# Patient Record
Sex: FEMALE | Race: WHITE | Employment: FULL TIME | URBAN - METROPOLITAN AREA
[De-identification: names, ages, dates, MRNs, and addresses within clinical notes are randomized per-mention and may not be internally consistent; named-entity substitution may affect disease eponyms.]

---

## 2018-11-09 ENCOUNTER — AMBULATORY - HEALTHEAST (OUTPATIENT)
Dept: ADMINISTRATIVE | Facility: CLINIC | Age: 53
End: 2018-11-09

## 2018-11-12 ENCOUNTER — OFFICE VISIT - HEALTHEAST (OUTPATIENT)
Dept: GERIATRICS | Facility: CLINIC | Age: 53
End: 2018-11-12

## 2018-11-12 DIAGNOSIS — T56.891D LITHIUM TOXICITY, ACCIDENTAL OR UNINTENTIONAL, SUBSEQUENT ENCOUNTER: ICD-10-CM

## 2018-11-12 DIAGNOSIS — I89.0 LYMPHEDEMA: ICD-10-CM

## 2018-11-12 DIAGNOSIS — S22.42XD CLOSED FRACTURE OF MULTIPLE RIBS OF LEFT SIDE WITH ROUTINE HEALING, SUBSEQUENT ENCOUNTER: ICD-10-CM

## 2018-11-12 DIAGNOSIS — I10 ESSENTIAL HYPERTENSION: ICD-10-CM

## 2018-11-12 DIAGNOSIS — F32.1 CURRENT MODERATE EPISODE OF MAJOR DEPRESSIVE DISORDER, UNSPECIFIED WHETHER RECURRENT (H): ICD-10-CM

## 2018-11-14 ENCOUNTER — OFFICE VISIT - HEALTHEAST (OUTPATIENT)
Dept: GERIATRICS | Facility: CLINIC | Age: 53
End: 2018-11-14

## 2018-11-14 DIAGNOSIS — I10 ESSENTIAL HYPERTENSION: ICD-10-CM

## 2018-11-14 DIAGNOSIS — S22.42XD CLOSED FRACTURE OF MULTIPLE RIBS OF LEFT SIDE WITH ROUTINE HEALING, SUBSEQUENT ENCOUNTER: ICD-10-CM

## 2018-11-14 DIAGNOSIS — I89.0 LYMPHEDEMA: ICD-10-CM

## 2018-11-14 DIAGNOSIS — F32.1 CURRENT MODERATE EPISODE OF MAJOR DEPRESSIVE DISORDER, UNSPECIFIED WHETHER RECURRENT (H): ICD-10-CM

## 2018-11-14 DIAGNOSIS — T56.891D LITHIUM TOXICITY, ACCIDENTAL OR UNINTENTIONAL, SUBSEQUENT ENCOUNTER: ICD-10-CM

## 2018-11-15 ENCOUNTER — OFFICE VISIT - HEALTHEAST (OUTPATIENT)
Dept: GERIATRICS | Facility: CLINIC | Age: 53
End: 2018-11-15

## 2018-11-15 DIAGNOSIS — E53.8 VITAMIN B 12 DEFICIENCY: ICD-10-CM

## 2018-11-15 DIAGNOSIS — F41.9 ANXIETY: ICD-10-CM

## 2018-11-15 DIAGNOSIS — S22.49XD CLOSED FRACTURE OF MULTIPLE RIBS WITH ROUTINE HEALING, UNSPECIFIED LATERALITY, SUBSEQUENT ENCOUNTER: ICD-10-CM

## 2018-11-15 DIAGNOSIS — R52 PAIN: ICD-10-CM

## 2018-11-15 DIAGNOSIS — T56.891D LITHIUM TOXICITY, ACCIDENTAL OR UNINTENTIONAL, SUBSEQUENT ENCOUNTER: ICD-10-CM

## 2018-11-15 DIAGNOSIS — F51.02 ADJUSTMENT INSOMNIA: ICD-10-CM

## 2018-11-15 DIAGNOSIS — K21.9 GASTROESOPHAGEAL REFLUX DISEASE WITHOUT ESOPHAGITIS: ICD-10-CM

## 2018-11-15 DIAGNOSIS — K59.01 SLOW TRANSIT CONSTIPATION: ICD-10-CM

## 2018-11-16 ENCOUNTER — RECORDS - HEALTHEAST (OUTPATIENT)
Dept: LAB | Facility: CLINIC | Age: 53
End: 2018-11-16

## 2018-11-16 ENCOUNTER — COMMUNICATION - HEALTHEAST (OUTPATIENT)
Dept: GERIATRICS | Facility: CLINIC | Age: 53
End: 2018-11-16

## 2018-11-16 DIAGNOSIS — R52 PAIN: ICD-10-CM

## 2018-11-16 LAB
ANION GAP SERPL CALCULATED.3IONS-SCNC: 7 MMOL/L (ref 5–18)
BUN SERPL-MCNC: 7 MG/DL (ref 8–22)
CALCIUM SERPL-MCNC: 9.3 MG/DL (ref 8.5–10.5)
CHLORIDE BLD-SCNC: 102 MMOL/L (ref 98–107)
CO2 SERPL-SCNC: 32 MMOL/L (ref 22–31)
CREAT SERPL-MCNC: 0.77 MG/DL (ref 0.6–1.1)
GFR SERPL CREATININE-BSD FRML MDRD: >60 ML/MIN/1.73M2
GLUCOSE BLD-MCNC: 75 MG/DL (ref 70–125)
POTASSIUM BLD-SCNC: 3.7 MMOL/L (ref 3.5–5)
SODIUM SERPL-SCNC: 141 MMOL/L (ref 136–145)

## 2018-11-19 ENCOUNTER — AMBULATORY - HEALTHEAST (OUTPATIENT)
Dept: GERIATRICS | Facility: CLINIC | Age: 53
End: 2018-11-19

## 2019-07-23 ENCOUNTER — HOSPITAL ENCOUNTER (INPATIENT)
Facility: CLINIC | Age: 54
LOS: 3 days | Discharge: HOME | DRG: 885 | End: 2019-07-26
Attending: PSYCHIATRY & NEUROLOGY | Admitting: PSYCHIATRY & NEUROLOGY
Payer: COMMERCIAL

## 2019-07-23 DIAGNOSIS — F31.81 BIPOLAR 2 DISORDER (H): ICD-10-CM

## 2019-07-23 DIAGNOSIS — F33.2 SEVERE RECURRENT MAJOR DEPRESSION WITHOUT PSYCHOTIC FEATURES (H): Primary | ICD-10-CM

## 2019-07-23 PROBLEM — F32.A DEPRESSION: Status: ACTIVE | Noted: 2019-07-23

## 2019-07-23 LAB
ANION GAP SERPL CALCULATED.3IONS-SCNC: 4 MMOL/L (ref 3–14)
BUN SERPL-MCNC: 6 MG/DL (ref 7–30)
CALCIUM SERPL-MCNC: 8.6 MG/DL (ref 8.5–10.1)
CHLORIDE SERPL-SCNC: 107 MMOL/L (ref 94–109)
CO2 SERPL-SCNC: 29 MMOL/L (ref 20–32)
CREAT SERPL-MCNC: 0.58 MG/DL (ref 0.52–1.04)
ERYTHROCYTE [DISTWIDTH] IN BLOOD BY AUTOMATED COUNT: 13.5 % (ref 10–15)
GFR SERPL CREATININE-BSD FRML MDRD: >90 ML/MIN/{1.73_M2}
GLUCOSE SERPL-MCNC: 96 MG/DL (ref 70–99)
HCG SERPL QL: NEGATIVE
HCT VFR BLD AUTO: 38.4 % (ref 35–47)
HGB BLD-MCNC: 12.9 G/DL (ref 11.7–15.7)
INR PPP: 1.89 (ref 0.86–1.14)
MCH RBC QN AUTO: 31.4 PG (ref 26.5–33)
MCHC RBC AUTO-ENTMCNC: 33.6 G/DL (ref 31.5–36.5)
MCV RBC AUTO: 93 FL (ref 78–100)
PLATELET # BLD AUTO: 375 10E9/L (ref 150–450)
POTASSIUM SERPL-SCNC: 3.9 MMOL/L (ref 3.4–5.3)
RBC # BLD AUTO: 4.11 10E12/L (ref 3.8–5.2)
SODIUM SERPL-SCNC: 140 MMOL/L (ref 133–144)
TSH SERPL DL<=0.005 MIU/L-ACNC: 3.6 MU/L (ref 0.4–4)
WBC # BLD AUTO: 9.5 10E9/L (ref 4–11)

## 2019-07-23 PROCEDURE — 25000132 ZZH RX MED GY IP 250 OP 250 PS 637: Performed by: PSYCHIATRY & NEUROLOGY

## 2019-07-23 PROCEDURE — 85610 PROTHROMBIN TIME: CPT | Performed by: PSYCHIATRY & NEUROLOGY

## 2019-07-23 PROCEDURE — 93010 ELECTROCARDIOGRAM REPORT: CPT | Performed by: INTERNAL MEDICINE

## 2019-07-23 PROCEDURE — 84703 CHORIONIC GONADOTROPIN ASSAY: CPT | Performed by: PSYCHIATRY & NEUROLOGY

## 2019-07-23 PROCEDURE — 84443 ASSAY THYROID STIM HORMONE: CPT | Performed by: PSYCHIATRY & NEUROLOGY

## 2019-07-23 PROCEDURE — 36415 COLL VENOUS BLD VENIPUNCTURE: CPT | Performed by: PSYCHIATRY & NEUROLOGY

## 2019-07-23 PROCEDURE — 93005 ELECTROCARDIOGRAM TRACING: CPT

## 2019-07-23 PROCEDURE — 25000132 ZZH RX MED GY IP 250 OP 250 PS 637

## 2019-07-23 PROCEDURE — 85027 COMPLETE CBC AUTOMATED: CPT | Performed by: PSYCHIATRY & NEUROLOGY

## 2019-07-23 PROCEDURE — 12400000 ZZH R&B MH

## 2019-07-23 PROCEDURE — 80048 BASIC METABOLIC PNL TOTAL CA: CPT | Performed by: PSYCHIATRY & NEUROLOGY

## 2019-07-23 RX ORDER — ERGOCALCIFEROL 1.25 MG/1
50000 CAPSULE, LIQUID FILLED ORAL WEEKLY
Status: DISCONTINUED | OUTPATIENT
Start: 2019-07-24 | End: 2019-07-26 | Stop reason: HOSPADM

## 2019-07-23 RX ORDER — DULOXETIN HYDROCHLORIDE 60 MG/1
60 CAPSULE, DELAYED RELEASE ORAL DAILY
Status: DISCONTINUED | OUTPATIENT
Start: 2019-07-24 | End: 2019-07-26 | Stop reason: HOSPADM

## 2019-07-23 RX ORDER — LAMOTRIGINE 100 MG/1
100 TABLET ORAL DAILY
Status: DISCONTINUED | OUTPATIENT
Start: 2019-07-23 | End: 2019-07-26 | Stop reason: HOSPADM

## 2019-07-23 RX ORDER — CLONAZEPAM 0.5 MG/1
0.5 TABLET ORAL AT BEDTIME
Status: DISCONTINUED | OUTPATIENT
Start: 2019-07-23 | End: 2019-07-26 | Stop reason: HOSPADM

## 2019-07-23 RX ORDER — LITHIUM CARBONATE 300 MG/1
300 TABLET, FILM COATED, EXTENDED RELEASE ORAL 2 TIMES DAILY
COMMUNITY

## 2019-07-23 RX ORDER — CLONAZEPAM 0.5 MG/1
0.5 TABLET ORAL AT BEDTIME
COMMUNITY

## 2019-07-23 RX ORDER — QUETIAPINE FUMARATE 300 MG/1
300 TABLET, FILM COATED ORAL AT BEDTIME
COMMUNITY

## 2019-07-23 RX ORDER — ALBUTEROL SULFATE 90 UG/1
1-2 AEROSOL, METERED RESPIRATORY (INHALATION) EVERY 4 HOURS PRN
Status: DISCONTINUED | OUTPATIENT
Start: 2019-07-23 | End: 2019-07-26 | Stop reason: HOSPADM

## 2019-07-23 RX ORDER — HYDROXYZINE HYDROCHLORIDE 25 MG/1
25 TABLET, FILM COATED ORAL EVERY 4 HOURS PRN
Status: DISCONTINUED | OUTPATIENT
Start: 2019-07-23 | End: 2019-07-26 | Stop reason: HOSPADM

## 2019-07-23 RX ORDER — CYANOCOBALAMIN 1000 UG/ML
1000 INJECTION, SOLUTION INTRAMUSCULAR; SUBCUTANEOUS
Status: DISCONTINUED | OUTPATIENT
Start: 2019-08-01 | End: 2019-07-26 | Stop reason: HOSPADM

## 2019-07-23 RX ORDER — PANTOPRAZOLE SODIUM 40 MG/1
40 TABLET, DELAYED RELEASE ORAL DAILY
COMMUNITY

## 2019-07-23 RX ORDER — DIPHENHYDRAMINE HCL 25 MG
25-50 CAPSULE ORAL EVERY 6 HOURS PRN
Status: DISCONTINUED | OUTPATIENT
Start: 2019-07-23 | End: 2019-07-26 | Stop reason: HOSPADM

## 2019-07-23 RX ORDER — PANTOPRAZOLE SODIUM 40 MG/1
40 TABLET, DELAYED RELEASE ORAL DAILY
Status: DISCONTINUED | OUTPATIENT
Start: 2019-07-24 | End: 2019-07-26 | Stop reason: HOSPADM

## 2019-07-23 RX ORDER — WARFARIN SODIUM 5 MG/1
5 TABLET ORAL DAILY
Status: DISCONTINUED | OUTPATIENT
Start: 2019-07-23 | End: 2019-07-23

## 2019-07-23 RX ORDER — WARFARIN SODIUM 10 MG/1
10 TABLET ORAL
Status: COMPLETED | OUTPATIENT
Start: 2019-07-23 | End: 2019-07-23

## 2019-07-23 RX ORDER — LITHIUM CARBONATE 300 MG/1
300 TABLET, FILM COATED, EXTENDED RELEASE ORAL 2 TIMES DAILY
Status: DISCONTINUED | OUTPATIENT
Start: 2019-07-23 | End: 2019-07-24

## 2019-07-23 RX ORDER — GABAPENTIN 300 MG/1
300 CAPSULE ORAL 3 TIMES DAILY
Status: DISCONTINUED | OUTPATIENT
Start: 2019-07-23 | End: 2019-07-26 | Stop reason: HOSPADM

## 2019-07-23 RX ORDER — LAMOTRIGINE 100 MG/1
100 TABLET ORAL DAILY
COMMUNITY

## 2019-07-23 RX ORDER — LAMOTRIGINE 100 MG/1
200 TABLET ORAL DAILY
Status: DISCONTINUED | OUTPATIENT
Start: 2019-07-24 | End: 2019-07-26 | Stop reason: HOSPADM

## 2019-07-23 RX ORDER — ERGOCALCIFEROL 1.25 MG/1
50000 CAPSULE ORAL WEEKLY
COMMUNITY

## 2019-07-23 RX ORDER — LORATADINE 10 MG/1
10 TABLET ORAL DAILY
Status: DISCONTINUED | OUTPATIENT
Start: 2019-07-24 | End: 2019-07-26 | Stop reason: HOSPADM

## 2019-07-23 RX ADMIN — QUETIAPINE FUMARATE 300 MG: 200 TABLET ORAL at 22:55

## 2019-07-23 RX ADMIN — GABAPENTIN 300 MG: 300 CAPSULE ORAL at 16:59

## 2019-07-23 RX ADMIN — WARFARIN SODIUM 10 MG: 10 TABLET ORAL at 18:24

## 2019-07-23 RX ADMIN — LAMOTRIGINE 100 MG: 100 TABLET ORAL at 16:59

## 2019-07-23 RX ADMIN — HYDROXYZINE HYDROCHLORIDE 25 MG: 25 TABLET ORAL at 20:16

## 2019-07-23 RX ADMIN — GABAPENTIN 300 MG: 300 CAPSULE ORAL at 22:55

## 2019-07-23 ASSESSMENT — ACTIVITIES OF DAILY LIVING (ADL)
TRANSFERRING: 0-->INDEPENDENT
HYGIENE/GROOMING: INDEPENDENT
NUMBER_OF_TIMES_PATIENT_HAS_FALLEN_WITHIN_LAST_SIX_MONTHS: 3
DRESS: 0-->INDEPENDENT
BATHING: 0-->INDEPENDENT
SWALLOWING: 0-->SWALLOWS FOODS/LIQUIDS WITHOUT DIFFICULTY
TOILETING: 0-->INDEPENDENT
AMBULATION: 0-->INDEPENDENT
ORAL_HYGIENE: INDEPENDENT
DRESS: SCRUBS (BEHAVIORAL HEALTH);INDEPENDENT
COGNITION: 0 - NO COGNITION ISSUES REPORTED
RETIRED_EATING: 0-->INDEPENDENT
LAUNDRY: WITH SUPERVISION
RETIRED_COMMUNICATION: 0-->UNDERSTANDS/COMMUNICATES WITHOUT DIFFICULTY
FALL_HISTORY_WITHIN_LAST_SIX_MONTHS: YES

## 2019-07-23 ASSESSMENT — MIFFLIN-ST. JEOR: SCORE: 1598.32

## 2019-07-23 NOTE — PHARMACY-ANTICOAGULATION SERVICE
Clinical Pharmacy - Warfarin Dosing Consult     Pharmacy has been consulted to manage this patient s warfarin therapy.  Indication: DVT/ PE Treatment  Therapy Goal: INR 2-3  Warfarin Prior to Admission: Yes  Warfarin PTA Regimen: 7.5 mg on Monday and Friday, 10 mg on all other days   Dose Comments: Sightly subtherapeutic     INR   Date Value Ref Range Status   07/23/2019 1.89 (H) 0.86 - 1.14 Final   10/01/2014 2.95 (H) 0.86 - 1.14 Final       Recommend warfarin 10 mg today.  Pharmacy will monitor Nikky Hancock daily and order warfarin doses to achieve specified goal.      Please contact pharmacy as soon as possible if the warfarin needs to be held for a procedure or if the warfarin goals change.      Ritchie HirschD

## 2019-07-23 NOTE — PLAN OF CARE
Pt mood is depressed, emotional when talking to staff but able to collect herself. Thought process is intact. Behavior is appropriate.  Insight is good.  Pt denies suicidal ideation.  Pt is agreeable with staff and watched the ECT video with her , all questions answered.  EKG, and labs collected, still need Hospitalist to clear pt.  Up and ate supper with her peers this evening.

## 2019-07-23 NOTE — PROGRESS NOTES
07/23/19 1455   Patient Belongings   Did you bring any home meds/supplements to the hospital?  No   Patient Belongings locker   Patient Belongings Put in Hospital Secure Location (Security or Locker, etc.) clothing;other (see comments)   Belongings Search Yes   Clothing Search Yes   Second Staff Annamarie   Comment All belongings searched and placed in locker     T-Shirt x4  Pants  Shoes w/laces  Socks x9  CPAP w/Bag  Gym Bag  Books x3  Glasses w/Case  Underwear x8  Shorts w/Strings  Sweat pants w/Strings x3  Bra x6  Hair Brush  Toothpaste  Toothbrush  Feminine Pads  Lotion  Baby Powder  Face Cleanser  Soap    Admission:  I am responsible for any personal items that are not sent to the safe or pharmacy. [facility] is not responsible for loss, theft or damage of any property in my possession.        Patient Signature: ___________________________________________      Date/Time:__________________________     Staff Signature: __________________________________      Date/Time:__________________________     2nd Staff person, if patient is unable/unwilling to sign:      __________________________________________________________      Date/Time: __________________________        Discharge:  [facility] has returned all of my personal belongings:     Patient Signature: ________________________________________     Date/Time: ____________________________________     Staff Signature: ______________________________________     Date/Time:_____________________________________

## 2019-07-24 ENCOUNTER — ANESTHESIA EVENT (OUTPATIENT)
Dept: BEHAVIORAL HEALTH | Facility: CLINIC | Age: 54
End: 2019-07-24

## 2019-07-24 ENCOUNTER — ANESTHESIA (OUTPATIENT)
Dept: BEHAVIORAL HEALTH | Facility: CLINIC | Age: 54
End: 2019-07-24

## 2019-07-24 LAB — INR PPP: 1.76 (ref 0.86–1.14)

## 2019-07-24 PROCEDURE — 25000132 ZZH RX MED GY IP 250 OP 250 PS 637: Performed by: PSYCHIATRY & NEUROLOGY

## 2019-07-24 PROCEDURE — 99223 1ST HOSP IP/OBS HIGH 75: CPT | Mod: AI | Performed by: INTERNAL MEDICINE

## 2019-07-24 PROCEDURE — 90791 PSYCH DIAGNOSTIC EVALUATION: CPT

## 2019-07-24 PROCEDURE — 90853 GROUP PSYCHOTHERAPY: CPT

## 2019-07-24 PROCEDURE — 12400000 ZZH R&B MH

## 2019-07-24 PROCEDURE — 36415 COLL VENOUS BLD VENIPUNCTURE: CPT | Performed by: PHYSICIAN ASSISTANT

## 2019-07-24 PROCEDURE — 85610 PROTHROMBIN TIME: CPT | Performed by: PHYSICIAN ASSISTANT

## 2019-07-24 RX ORDER — ACETAMINOPHEN 325 MG/1
650 TABLET ORAL EVERY 4 HOURS PRN
Status: DISCONTINUED | OUTPATIENT
Start: 2019-07-24 | End: 2019-07-26 | Stop reason: HOSPADM

## 2019-07-24 RX ORDER — WARFARIN SODIUM 10 MG/1
10 TABLET ORAL
Status: COMPLETED | OUTPATIENT
Start: 2019-07-24 | End: 2019-07-24

## 2019-07-24 RX ADMIN — GABAPENTIN 300 MG: 300 CAPSULE ORAL at 07:59

## 2019-07-24 RX ADMIN — LAMOTRIGINE 100 MG: 100 TABLET ORAL at 13:51

## 2019-07-24 RX ADMIN — HYDROXYZINE HYDROCHLORIDE 25 MG: 25 TABLET ORAL at 10:35

## 2019-07-24 RX ADMIN — LORATADINE 10 MG: 10 TABLET ORAL at 07:59

## 2019-07-24 RX ADMIN — ACETAMINOPHEN 650 MG: 325 TABLET, FILM COATED ORAL at 18:47

## 2019-07-24 RX ADMIN — WARFARIN SODIUM 10 MG: 10 TABLET ORAL at 18:45

## 2019-07-24 RX ADMIN — QUETIAPINE FUMARATE 300 MG: 200 TABLET ORAL at 22:10

## 2019-07-24 RX ADMIN — FLUTICASONE FUROATE AND VILANTEROL TRIFENATATE 1 PUFF: 200; 25 POWDER RESPIRATORY (INHALATION) at 08:00

## 2019-07-24 RX ADMIN — ERGOCALCIFEROL 50000 UNITS: 1.25 CAPSULE, LIQUID FILLED ORAL at 10:34

## 2019-07-24 RX ADMIN — ACETAMINOPHEN 650 MG: 325 TABLET, FILM COATED ORAL at 09:23

## 2019-07-24 RX ADMIN — DULOXETINE HYDROCHLORIDE 60 MG: 60 CAPSULE, DELAYED RELEASE ORAL at 07:59

## 2019-07-24 RX ADMIN — LAMOTRIGINE 200 MG: 100 TABLET ORAL at 08:00

## 2019-07-24 RX ADMIN — GABAPENTIN 300 MG: 300 CAPSULE ORAL at 22:10

## 2019-07-24 RX ADMIN — LITHIUM CARBONATE 300 MG: 300 TABLET, EXTENDED RELEASE ORAL at 07:59

## 2019-07-24 RX ADMIN — CLONAZEPAM 0.5 MG: 0.5 TABLET ORAL at 22:10

## 2019-07-24 RX ADMIN — PANTOPRAZOLE SODIUM 40 MG: 40 TABLET, DELAYED RELEASE ORAL at 07:59

## 2019-07-24 RX ADMIN — GABAPENTIN 300 MG: 300 CAPSULE ORAL at 16:18

## 2019-07-24 ASSESSMENT — ACTIVITIES OF DAILY LIVING (ADL)
ORAL_HYGIENE: INDEPENDENT
DRESS: SCRUBS (BEHAVIORAL HEALTH)
HYGIENE/GROOMING: INDEPENDENT

## 2019-07-24 NOTE — H&P
[facility]    History and Physical - Hospitalist Service       Date of Admission:  7/23/2019    Assessment & Plan   Nikky Hancock is a 54 year old female admitted on 7/23/2019 to inpatient psychiatry for worsening anxiety. Hospitalist consulted for ECT clearance    Bipolar 2 disorder  Generalized Anxiety Disorder: Management per Psychiatry. Patient plans to undergo ECT. She is able to complete 7 mets of activity and has no history of CAD. Her EKG is without ischemic changes. She does take Warfarin for protein S deficiency. Discussed with patient slightly increased risk of intracranial bleed in the setting of anticoagulation but if INR kept below 3 risk is minimal.  - Medically optimized to proceed with ECT. Discussed with pharmacy to keep INR closer to 2-2.5 with ECT. BP stable. Could consider BB if develops significant HTN with ECT  - Further management per Psychiatry    H/o recurrent DVT/PE  Protein S deficiency: Anticoagulated with Warfarin. INR 1.8  - Continue warfarin with Pharmacy to dose. Discussed with pharmacy, will keep goal close to 2-2.5    GERD  - Continue PTA PPI    TANYA  - Continue CPAP       Diet: Regular Diet Adult    DVT Prophylaxis: Warfarin  Poon Catheter: not present  Code Status: Full Code      Disposition Plan   Expected discharge: Per Psych. Hospitalist will sign off. Please call if questions  Entered: Lis Martines PA-C 07/24/2019, 8:24 AM     The patient's care was discussed with the Bedside Nurse and Patient.    Lis Martines PA-C  [facility]    Patient seen and examined.  Agree with impression and plan. Will continue warfarin 10 mg daily, and check INR daily (not sure why INR borderline low -- she did not miss any doses).  Has mild headache -- will use Tylenol PRN.  Can always use a NSAID if needed (I.e. Ibuprofen) tylenol usually works, and is safer given she is on Warfarin.     Nuno Vergara  Pager: [phone]  Cell Phone:   [phone]      ______________________________________________________________________    Chief Complaint   Worsening Anxiety        History of Present Illness   Nikky Hancock is a 54 year old female with a past medical history of bipolar affective disorder and anxiety as well as protein S deficiency and chronic anticoagulation who was admitted to inpatient psychiatry with plans for inpatient ECT therapy.    Patient is evaluated in the stepdown unit of psychiatry.  She offers no complaints at this time.  She is been anticoagulated with warfarin for approximately the past 7 years.  No recent history of DVT.  Her goal INR is 2-3.  She is never undergone ECT in the past.  Has undergone previous anesthesia without complications.  She has no history of coronary artery disease.  Is able to complete 7 METs of activity without exertional symptoms.  No recent exertional shortness of breath or chest pain.  No recent URI symptoms.    Review of Systems    The 10 point Review of Systems is negative other than noted in the HPI or here.     Past Medical History    I have reviewed this patient's medical history and updated it with pertinent information if needed.   Past Medical History:   Diagnosis Date     Alcohol abuse, in remission      Alcohol addiction (H)      Anxiety      Asthma      Bipolar 2 disorder (H)      Depressive disorder      GERD (gastroesophageal reflux disease)      TANYA (obstructive sleep apnea)      Protein S deficiency (H)      Pulmonary embolism (H)        Past Surgical History   I have reviewed this patient's surgical history and updated it with pertinent information if needed.  Past Surgical History:   Procedure Laterality Date     BYPASS GASTRIC DUODENAL SWITCH       C NONSPECIFIC PROCEDURE      c/s, gastric bypass , nicolas     CHOLECYSTECTOMY       GYN SURGERY         Social History   I have reviewed this patient's social history and updated it with pertinent information if needed.  Social History      Tobacco Use     Smoking status: Never Smoker   Substance Use Topics     Alcohol use: No     Drug use: Not on file       Family History   I have reviewed this patient's family history and updated it with pertinent information if needed.   Family History   Problem Relation Age of Onset     Lipids Mother      Hypertension Father      Lipids Father      Lipids Brother      Lipids Brother        Prior to Admission Medications   Prior to Admission Medications   Prescriptions Last Dose Informant Patient Reported? Taking?   ADVAIR DISKUS 500-50 MCG/DOSE IN AEPB 2019 at Unknown time  Yes Yes   Si PUFF BY INHALATION EVERY day   CAMPRAL 333 MG OR TBEC Unknown at Unknown time  Yes No   Si TABLETS ORALLY 3 TIMES DAILY   CLARITIN 10 MG PO TABS 2019 at Unknown time  Yes Yes   Si TABLET ORALLY DAILY   DULoxetine (CYMBALTA) 60 MG capsule 2019 at Unknown time  No Yes   Sig: Take 1 capsule by mouth daily.   EPINEPHrine (EPIPEN) 0.3 MG/0.3ML injection Unknown at Unknown time  No No   Sig: Inject 0.3 mLs into the muscle once as needed for anaphylaxis for 1 dose.   FOLIC ACID 400 MCG PO TABS Unknown at Unknown time  Yes No   Si TABLET ORALLY DAILY   HYDROcodone-acetaminophen (NORCO) 5-325 MG per tablet Unknown at Unknown time  No No   Sig: Take 1-2 tablets by mouth every 4 hours as needed for pain   Lansoprazole (PREVACID PO) Unknown at Unknown time  Yes No   Sig: Take 30 mg by mouth every morning (before breakfast).   MULTI-VITAMIN OR TABS Unknown at Unknown time  Yes No   Si TABLET ORALLY DAILY   NALTREXONE HCL PO Unknown at Unknown time  Yes No   Sig: Take 50 mg by mouth daily.   QUEtiapine (SEROQUEL XR) 300 MG 24 hr tablet Unknown at Unknown time  No No   Sig: Take 1 tablet by mouth daily.   QUEtiapine (SEROQUEL) 300 MG tablet 2019 at Unknown time  Yes Yes   Sig: Take 300 mg by mouth At Bedtime   REQUIP 1 MG PO TABS Unknown at Unknown time  Yes No   Si TABLET ORALLY AT BEDTIME, may take  an additional 1 mg in evening as needed   TRAZODONE  MG OR TABS Unknown at Unknown time  Yes No   Si MG = 2 TABLETS ORALLY AT BEDTIME   VENTOLIN  (90 BASE) MCG/ACT IN AERS Unknown at Unknown time  Yes No   Si PUFFS BY INHALATION EVERY 4 HOURS AS NEEDED   VITAMIN B-12 1000 MCG/ML IJ SOLN 2019 at Unknown time  Yes Yes   Si MCG ONCE MONTHLY BY INTRAMUSCULAR INJECTION   benzonatate (TESSALON) 200 MG capsule Unknown at Unknown time  No No   Sig: Take 1 capsule (200 mg) by mouth 3 times daily as needed for cough   clonazePAM (KLONOPIN) 0.5 MG tablet 2019 at Unknown time  Yes Yes   Sig: Take 0.5 mg by mouth At Bedtime   diphenhydrAMINE (BENADRYL) 25 MG tablet Past Week at Unknown time  No Yes   Sig: Take 2 tablets by mouth every 6 hours as needed (swelling, itching).   disulfiram (ANTABUSE) 250 MG tablet Unknown at Unknown time  Yes No   Sig: Take 500 mg by mouth daily.   divalproex (DEPAKOTE) 500 MG 24 hr tablet Unknown at Unknown time  No No   Sig: Take 2 tablets by mouth every evening.   ergocalciferol (ERGOCALCIFEROL) 18617 units capsule Past Week at Unknown time  Yes Yes   Sig: Take 50,000 Units by mouth once a week   gabapentin (NEURONTIN) 400 MG capsule 2019 at Unknown time  Yes Yes   Sig: Take 300 mg by mouth 3 times daily    ipratropium - albuterol 0.5 mg/2.5 mg/3 mL (DUONEB) 0.5-2.5 (3) MG/3ML nebulization Unknown at Unknown time  No No   Sig: Take 1 vial (3 mLs) by nebulization every 4 hours as needed for shortness of breath / dyspnea   lamoTRIgine (LAMICTAL) 100 MG tablet 2019 at Unknown time  Yes Yes   Sig: Take 100 mg by mouth daily 200 mg every morning, 100 mg QHS   lithium ER (LITHOBID) 300 MG CR tablet 2019 at Unknown time  Yes Yes   Sig: Take 300 mg by mouth 2 times daily   mirtazapine (REMERON) 15 MG tablet Unknown at Unknown time  No No   Sig: Take 1 tablet by mouth At Bedtime.   pantoprazole (PROTONIX) 40 MG EC tablet 2019 at Unknown time  Yes  Yes   Sig: Take 40 mg by mouth daily   warfarin (COUMADIN) 5 MG tablet 7/22/2019 at Unknown time  No Yes   Sig: Take 1 tablet by mouth daily.   Patient taking differently: Take 1 tablet by mouth daily 1.5 tab every M,F.    2 tab every other day T,W,Th,Sat,Sun      Facility-Administered Medications: None     Allergies   Allergies   Allergen Reactions     Morphine Anaphylaxis       Physical Exam   Vital Signs: Temp: 98.5  F (36.9  C) Temp src: Oral BP: 119/83 Pulse: 73   Resp: 16 SpO2: 97 % O2 Device: None (Room air)    Weight: 236 lbs 1.6 oz    Constitutional: Alert, resting comfortably in NAD  Respiratory: Normal effort, symmetric expansion, no crackles or wheezing  Cardiovascular: RRR no murmurs   GI: Non distended, normal bowels sounds, no tenderness or guarding  MSK: Bilaterl 2+ LE.  Dorsalis pedis pulse palpated bilaterally.   Skin/Integumen: Clear  Neuro: CN II-XII grossly intact  Psych:  Alert and oriented x 3. Normal affect      Data   Data reviewed today: I reviewed all medications, new labs and imaging results over the last 24 hours. I personally reviewed no images or EKG's today.    Recent Labs   Lab 07/23/19  1655   WBC 9.5   HGB 12.9   MCV 93      INR 1.89*      POTASSIUM 3.9   CHLORIDE 107   CO2 29   BUN 6*   CR 0.58   ANIONGAP 4   STACIE 8.6   GLC 96       No results found for this or any previous visit (from the past 24 hour(s)).

## 2019-07-24 NOTE — PROGRESS NOTES
Spoke with hospitalist on [facility] to notify that a H & P and ECT clearance consult was entered at 1445.  Was informed of where pt is on the list and that it may occur on Nights.    Another Hospitalist had performed a chart review, called to express concern with Coumadin dose being subtherapeutic and the potential risks with the procedure. Stated they would come when they could, but multiple codes had occurred throughout the hospital.    Hospitalist called at 0450 to inquire if pt was sleeping, which she was.  Informed that we could wake her up and notified when the pt was scheduled for her procedure.  Hospitalist was uncertain if there was time to properly perform the consult prior to ECT this morning.    Continue awaiting clearance for ECT.

## 2019-07-24 NOTE — PROGRESS NOTES
SPIRITUAL HEALTH SERVICES Progress Note  [facility]    Visited per request.     Pt reported feeling frustrated having to wait for her ECT procedure longer than expected. Pt wondered what the impact would be if she was to stay for the weekend in the hospital. 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 SH acknowledged Pt's frustration. Pt will reach out to SH if needing support.     SH is available if further needs arise.     Monty Gomes  Chaplain Resident

## 2019-07-24 NOTE — PROGRESS NOTES
[facility] Psychiatric Progress Note      Interval History:   Pt seen, team meeting held with , nursing staff, OT, and PA's to review diagnosis and treatment plan.  Staff report that she did not have her as scheduled because she did not get evaluated by the hospitalist service on some of this morning which may be too late for her to undergo treatment today.  Patient is upset about this but has been able to redirect her anger.  She spoke about her current concerns which include her daughter.  She states her daughter does not understand mental illness and she feels that she is slowly pushing her away.  She feels that her undergoing ECT will be another reason for her to keep her grandchildren from her.  She tells me that her daughter compares her to the mothers of her other friends.  She states she thinks this is unfair.  She states that her daughter makes her feel that she is responsible for her mental illness and her alcohol use history.  She was educated about behavioral activation and also the potential benefits of JOVITA especially as it pertains to support groups for family members of mentally ill individuals.  Patient verbalized understanding and agreed to follow treatment recommendations.  She is hoping to complete her first treatment on Friday and possibly leave the on the same day or the next so that her  brings her for the subsequent scheduled treatments.  She does not endorse current self-harm thoughts plans or intent but claims she puts on show so no one really knows she is depressed.     Review of systems:   The Review of Systems is negative other than noted in the HPI     Medications:       clonazePAM  0.5 mg Oral At Bedtime     [START ON 8/1/2019] cyanocobalamin  1,000 mcg Intramuscular Q30 Days     DULoxetine  60 mg Oral Daily     fluticasone-vilanterol  1 puff Inhalation Daily     gabapentin  300 mg Oral TID     lamoTRIgine  100 mg Oral Daily     lamoTRIgine  200 mg  Oral Daily     loratadine  10 mg Oral Daily     pantoprazole  40 mg Oral Daily     QUEtiapine  300 mg Oral At Bedtime     vitamin D2  50,000 Units Oral Weekly     warfarin  10 mg Oral ONCE at 18:00     acetaminophen, albuterol, diphenhydrAMINE, HOLD MEDICATION, hydrOXYzine, Warfarin Therapy Reminder    Mental Status Examination:     Appearance:  awake, alert, adequately groomed and casually dressed  Eye Contact:  better  Speech:  clear, coherent  Psychomotor Behavior:  no evidence of tardive dyskinesia, dystonia, or tics and fidgeting  Mood: Dysphoric    Affect: Mood congruent with full range.  Tearful.  Thought Process:  logical, linear and goal oriented no loose associations  Thought Content:  no evidence of suicidal ideation or homicidal ideation.  Denies the presence of auditory or visual hallucinations  Oriented to:  time, person, and place  Attention Span and Concentration:  limited  Recent and Remote Memory:  limited  Fund of Knowledge: appropriate  Muscle Strength and Tone: normal  Gait and Station: Normal  Insight:  fair  Judgment:  limited          Labs/Vitals:     Recent Results (from the past 24 hour(s))   EKG 12-lead, tracing only    Collection Time: 07/23/19  2:39 PM   Result Value Ref Range    Interpretation ECG Click View Image link to view waveform and result    Basic metabolic panel    Collection Time: 07/23/19  4:55 PM   Result Value Ref Range    Sodium 140 133 - 144 mmol/L    Potassium 3.9 3.4 - 5.3 mmol/L    Chloride 107 94 - 109 mmol/L    Carbon Dioxide 29 20 - 32 mmol/L    Anion Gap 4 3 - 14 mmol/L    Glucose 96 70 - 99 mg/dL    Urea Nitrogen 6 (L) 7 - 30 mg/dL    Creatinine 0.58 0.52 - 1.04 mg/dL    GFR Estimate >90 >60 mL/min/[1.73_m2]    GFR Estimate If Black >90 >60 mL/min/[1.73_m2]    Calcium 8.6 8.5 - 10.1 mg/dL   CBC with platelets    Collection Time: 07/23/19  4:55 PM   Result Value Ref Range    WBC 9.5 4.0 - 11.0 10e9/L    RBC Count 4.11 3.8 - 5.2 10e12/L    Hemoglobin 12.9 11.7 -  15.7 g/dL    Hematocrit 38.4 35.0 - 47.0 %    MCV 93 78 - 100 fl    MCH 31.4 26.5 - 33.0 pg    MCHC 33.6 31.5 - 36.5 g/dL    RDW 13.5 10.0 - 15.0 %    Platelet Count 375 150 - 450 10e9/L   INR    Collection Time: 07/23/19  4:55 PM   Result Value Ref Range    INR 1.89 (H) 0.86 - 1.14   TSH with free T4 reflex and/or T3 as indicated    Collection Time: 07/23/19  4:55 PM   Result Value Ref Range    TSH 3.60 0.40 - 4.00 mU/L   HCG qualitative    Collection Time: 07/23/19  4:55 PM   Result Value Ref Range    HCG Qualitative Serum Negative NEG^Negative   INR    Collection Time: 07/24/19  8:40 AM   Result Value Ref Range    INR 1.76 (H) 0.86 - 1.14     B/P: 119/83, T: 98.5, P: 73, R: 16    Impression:     Nikky Hancock is a 54-year-old  mother of 1 with two stepdaughters who presents to the hospital for electroconvulsive therapy on account of worsening depression and failing multiple atypical antipsychotics and antidepressants.  She is admitted for electroconvulsive therapy.       DIagnoses:     1.  Bipolar 2 disorder, current episode depressed without psychosis.   2.  Generalized anxiety disorder.   3.  Rule out panic disorder.   4.  Alcohol use disorder in remission.   5.  Gastroesophageal reflux disease.   6.  Protein S deficiency.   7.  Mild intermittent asthma.   8.  History of falls with no concussion.   9.  Obstructive sleep apnea.  10.  Personal history of pulmonary embolism and DVT.           Plan:   1. Written information given on medications. Side effects, risks, benefits reviewed.  2.  Continue current medications except lithium.  3.  Schedule first ECT treatment for 7/26/2019 at 0600 hrs.      Attestation:  Patient has been seen and evaluated by me,  Carol Cherry MD    PATIENT ID  Name: Nikky Hancock  MRN:[id number]  YOB: 1965

## 2019-07-24 NOTE — PLAN OF CARE
Patient continues to wait for medical clearance in order to proceed to ECT in am.  She was given a prn hydroxyzine 25mg for anxiety with slight relief obtained.  Patient denies any suicidal ideation & contracts for safety.  She declined to attend any groups.

## 2019-07-24 NOTE — PLAN OF CARE
Patient denies suicidal ideation. Calm and cooperative. Patient has fair insight. Plan is to have ECT Friday and see how she is doing after that with a possible discharge on Friday or otherwise on Monday or Tuesday after ECT. She became slightly anxious when talking about the possibilty of discharge on Friday. She is social with peers and attending groups. She states at home she was too depressed to go to appointments but at the hospital is out of her room and very conversational.  Stated that she is getting bored.

## 2019-07-24 NOTE — PROGRESS NOTES
07/24/19 1300   General Information   Date Initially Attended OT 07/24/19   Clinical Impression   Affect Appropriate to situation   Orientation Oriented to person, place and time   Appearance and ADLs General cleanliness observed in most areas   Attention to Internal Stimuli No observed signs   Interaction Skills Interacts appropriately with staff;Interacts appropriately with peers   Ability to Communicate Needs Independent   Verbal Content Appropriate to topic   Ability to Maintain Boundaries Maintains appropriate physical boundaries;Maintains appropriate verbal boundaries   Participation Participates with minimal encouragement   Concentration Concentrates 10-20 minutes   Ability to Concentrate With structure   Follows and Comprehends Directions Independently follows multi-step directions   Memory Delayed and immediate recall intact   Organization Independently organizes medium tasks   Decision Making Needs further assessment   Planning and Problem Solving Needs further assessment   Ability to Apply and Learn Concepts Applies within group structure   Frustrations / Stress Tolerance Needs further assessment   Level of Insight Identifies needs with structure/support   Self Esteem Poor self esteem   Social Supports Has knowledge of support systems   General Observation/Plan   General Observations/Plan See Comments     INITIAL O.T. ASSESSMENT:      Pt engaged in therapeutic activity addressing functional cognition and interpersonal skills with task set up.With initial task set up and MIN encouragement, pt participated in therapeutic group activity and discussion addressing recovery and wellness. Educated pt on [employer]'s 10 Guiding Principles of Recovery with pt verbalizing understanding and applying same to her recovery journey. Pt reported what recovery looks like to her (e.g.living a healthy life and being independent). Pt also ID'd what recovery will take (e.g. The desire to get well and positive supports) and  what she can gain (e.g. New friends and opportunities, a more fulfilling life).  Pt will continue to benefit from OT intervention to address implementation of positive functional coping skills, role performance, and community reintegration.      OT assessment completed by semi-structured interview and direct observation. Cited need for ECT/increased depression as the reason for current hospitalization. Goals ID'd include to improve self-confidence, to increase motivation, and to ID and implement new coping skills. OT staff explained the purpose of pt being involved in current treatment plan.      Plan: Encourage ongoing attendance as able to meet self-stated goals, implement positive coping skills, engage in therapeutic activities, and provide assessment ongoing.
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
## 2019-07-25 LAB — INR PPP: 1.65 (ref 0.86–1.14)

## 2019-07-25 PROCEDURE — 36415 COLL VENOUS BLD VENIPUNCTURE: CPT | Performed by: PHYSICIAN ASSISTANT

## 2019-07-25 PROCEDURE — 25000132 ZZH RX MED GY IP 250 OP 250 PS 637: Performed by: PSYCHIATRY & NEUROLOGY

## 2019-07-25 PROCEDURE — 12400000 ZZH R&B MH

## 2019-07-25 PROCEDURE — 85610 PROTHROMBIN TIME: CPT | Performed by: PHYSICIAN ASSISTANT

## 2019-07-25 PROCEDURE — 90853 GROUP PSYCHOTHERAPY: CPT

## 2019-07-25 RX ADMIN — CLONAZEPAM 0.5 MG: 0.5 TABLET ORAL at 21:31

## 2019-07-25 RX ADMIN — LAMOTRIGINE 200 MG: 100 TABLET ORAL at 07:56

## 2019-07-25 RX ADMIN — QUETIAPINE FUMARATE 300 MG: 200 TABLET ORAL at 21:31

## 2019-07-25 RX ADMIN — LAMOTRIGINE 100 MG: 100 TABLET ORAL at 13:37

## 2019-07-25 RX ADMIN — LORATADINE 10 MG: 10 TABLET ORAL at 07:56

## 2019-07-25 RX ADMIN — PANTOPRAZOLE SODIUM 40 MG: 40 TABLET, DELAYED RELEASE ORAL at 07:55

## 2019-07-25 RX ADMIN — WARFARIN SODIUM 12.5 MG: 10 TABLET ORAL at 18:07

## 2019-07-25 RX ADMIN — GABAPENTIN 300 MG: 300 CAPSULE ORAL at 07:55

## 2019-07-25 RX ADMIN — GABAPENTIN 300 MG: 300 CAPSULE ORAL at 21:32

## 2019-07-25 RX ADMIN — FLUTICASONE FUROATE AND VILANTEROL TRIFENATATE 1 PUFF: 200; 25 POWDER RESPIRATORY (INHALATION) at 07:57

## 2019-07-25 RX ADMIN — DULOXETINE HYDROCHLORIDE 60 MG: 60 CAPSULE, DELAYED RELEASE ORAL at 07:56

## 2019-07-25 RX ADMIN — GABAPENTIN 300 MG: 300 CAPSULE ORAL at 16:45

## 2019-07-25 RX ADMIN — ACETAMINOPHEN 650 MG: 325 TABLET, FILM COATED ORAL at 17:16

## 2019-07-25 ASSESSMENT — ACTIVITIES OF DAILY LIVING (ADL)
HYGIENE/GROOMING: INDEPENDENT
LAUNDRY: WITH SUPERVISION
ORAL_HYGIENE: INDEPENDENT
DRESS: STREET CLOTHES

## 2019-07-25 NOTE — PLAN OF CARE
BEHAVIORAL TEAM DISCUSSION  Participants: MD,PA,OT,RN,CM  Progress: Active in milieu, participating in decisions about her care.  Continued Stay Criteria/Rationale: To start ECT tomorrow.  Medical/Physical: Cleared for ECT.  Precautions: Code1  Behavioral Orders   Procedures    Code 1 - Restrict to Unit    Code 2 - 1:1 Staff Supervision     For ECT only    Code 2 - 1:1 Staff Supervision     For ECT only    Electroconvulsive therapy     Series of 6 - 8 (up to 12) treatments. Begin Date: 7/24/2019     Treating Psychiatrist providing ECT:   Adalid  Notified on: 7/23/2019    Electroconvulsive therapy     Series of up to 12 treatments. Begin Date: 07/24/2019    Treating Psychiatrist providing ECT:  Danny Tavarez MD    Notified on:  07/23/2019    Fall precautions    Fall precautions    Routine Programming     As clinically indicated    Status 15     Every 15 minutes.     Plan: Initiate ECT. Patient may elect to complete series on outpatient basis.  Rationale for change in precautions or plan: NA

## 2019-07-25 NOTE — PLAN OF CARE
Patient has pleasant affect with some anxiety,thought process intact, needs encouragement to attend groups,denies SI and is waiting for ECT to begin. Teaching done regarding ECT. Pt attending OT group, is med compliant. Pt states she may want to do some of her ECT outpatient. Pt encouraged to fill out her safety sheet and knows she is suppose to have someone with her after ECT and cannot drive.

## 2019-07-25 NOTE — PLAN OF CARE
Pt transitioned I'lly to OT group and engaged in therapeutic activity addressing functional cognition and interpersonal skills with task set up. With task set up, pt actively participated in therapeutic group activity and discussion addressing the importance of leisure activity engagement. Pt reports having difficulty participating in preferred leisure activities recently 2/2 illness sxs (e.g. Decreased concentration). Pt completed interest checklist and with MIN A, ID'd how to incorporate interests into her life more (e.g. Reading magazines instead of books). Pt will continue to benefit from OT intervention to address implementation of positive functional coping skills, role performance, and community reintegration.

## 2019-07-25 NOTE — PROGRESS NOTES
[facility] Psychiatric Progress Note      Interval History:   Pt seen, team meeting held with , nursing staff, OT, and PA's to review diagnosis and treatment plan. Staff report that the patient has been doing fairly well on the unit.  She tells me that she spoke with her daughter yesterday and the conversation went well.  She states she was able to explain her situation to her daughter particularly the fact that she had not brought her mental illness on herself.  She explained the risks and benefits of ECT to her daughter.  She admits that she herself is now anxious about the procedure but plans to move forward with it. Tolerating medications well without significant side effects. Denies suicidal or homicidal ideation.       Review of systems:   The Review of Systems is negative other than noted in the HPI     Medications:       clonazePAM  0.5 mg Oral At Bedtime     [START ON 8/1/2019] cyanocobalamin  1,000 mcg Intramuscular Q30 Days     DULoxetine  60 mg Oral Daily     fluticasone-vilanterol  1 puff Inhalation Daily     gabapentin  300 mg Oral TID     lamoTRIgine  100 mg Oral Daily     lamoTRIgine  200 mg Oral Daily     loratadine  10 mg Oral Daily     pantoprazole  40 mg Oral Daily     QUEtiapine  300 mg Oral At Bedtime     vitamin D2  50,000 Units Oral Weekly     warfarin  12.5 mg Oral ONCE at 18:00     acetaminophen, albuterol, diphenhydrAMINE, HOLD MEDICATION, hydrOXYzine, Warfarin Therapy Reminder    Mental Status Examination:     Appearance:  awake, alert, adequately groomed and casually dressed  Eye Contact:  better  Speech:  clear, coherent  Psychomotor Behavior:  no evidence of tardive dyskinesia, dystonia, or tics and fidgeting  Mood: Better   Affect: Mood congruent with full range.    Thought Process:  logical, linear and goal oriented no loose associations  Thought Content:  no evidence of suicidal ideation or homicidal ideation.  Denies the presence of auditory or visual  hallucinations  Oriented to:  time, person, and place  Attention Span and Concentration:  limited  Recent and Remote Memory:  limited  Fund of Knowledge: appropriate  Muscle Strength and Tone: normal  Gait and Station: Normal  Insight:  fair  Judgment:  limited          Labs/Vitals:     Recent Results (from the past 24 hour(s))   INR    Collection Time: 07/25/19  8:04 AM   Result Value Ref Range    INR 1.65 (H) 0.86 - 1.14     B/P: 119/83, T: 98.5, P: 73, R: 16    Impression:     Nikky Hancock is a 54-year-old  mother of 1 with two stepdaughters who presents to the hospital for electroconvulsive therapy on account of worsening depression and failing multiple atypical antipsychotics and antidepressants.  She is admitted for electroconvulsive therapy.       DIagnoses:     1.  Bipolar 2 disorder, current episode depressed without psychosis.   2.  Generalized anxiety disorder.   3.  Rule out panic disorder.   4.  Alcohol use disorder in remission.   5.  Gastroesophageal reflux disease.   6.  Protein S deficiency.   7.  Mild intermittent asthma.   8.  History of falls with no concussion.   9.  Obstructive sleep apnea.  10.  Personal history of pulmonary embolism and DVT.           Plan:   1. Written information given on medications. Side effects, risks, benefits reviewed.  2.  Continue current medications except lithium.  3.  Schedule first ECT treatment for 7/26/2019 at 0600 hrs.      Attestation:  Patient has been seen and evaluated by Carol griffin MD    PATIENT ID  Name: Nikky Hancock  MRN:[id number]  YOB: 1965

## 2019-07-26 ENCOUNTER — ANESTHESIA (OUTPATIENT)
Dept: SURGERY | Facility: CLINIC | Age: 54
End: 2019-07-26

## 2019-07-26 ENCOUNTER — ANESTHESIA EVENT (OUTPATIENT)
Dept: SURGERY | Facility: CLINIC | Age: 54
End: 2019-07-26

## 2019-07-26 ENCOUNTER — APPOINTMENT (OUTPATIENT)
Dept: SURGERY | Facility: CLINIC | Age: 54
End: 2019-07-26
Attending: PSYCHIATRY & NEUROLOGY
Payer: COMMERCIAL

## 2019-07-26 VITALS
RESPIRATION RATE: 16 BRPM | OXYGEN SATURATION: 100 % | SYSTOLIC BLOOD PRESSURE: 124 MMHG | BODY MASS INDEX: 44.57 KG/M2 | WEIGHT: 236.1 LBS | DIASTOLIC BLOOD PRESSURE: 67 MMHG | HEIGHT: 61 IN | HEART RATE: 76 BPM | TEMPERATURE: 98.4 F

## 2019-07-26 LAB
INR PPP: 1.64 (ref 0.86–1.14)
INTERPRETATION ECG - MUSE: NORMAL

## 2019-07-26 PROCEDURE — 36415 COLL VENOUS BLD VENIPUNCTURE: CPT | Performed by: PHYSICIAN ASSISTANT

## 2019-07-26 PROCEDURE — GZB2ZZZ ELECTROCONVULSIVE THERAPY, BILATERAL-SINGLE SEIZURE: ICD-10-PCS | Performed by: PSYCHIATRY & NEUROLOGY

## 2019-07-26 PROCEDURE — 25000566 ZZH SEVOFLURANE, EA 15 MIN

## 2019-07-26 PROCEDURE — 40000671 ZZH STATISTIC ANESTHESIA CASE

## 2019-07-26 PROCEDURE — 85610 PROTHROMBIN TIME: CPT | Performed by: PHYSICIAN ASSISTANT

## 2019-07-26 PROCEDURE — 25800030 ZZH RX IP 258 OP 636: Performed by: ANESTHESIOLOGY

## 2019-07-26 PROCEDURE — 25000132 ZZH RX MED GY IP 250 OP 250 PS 637: Performed by: PSYCHIATRY & NEUROLOGY

## 2019-07-26 PROCEDURE — 25000128 H RX IP 250 OP 636: Performed by: NURSE ANESTHETIST, CERTIFIED REGISTERED

## 2019-07-26 PROCEDURE — 25000125 ZZHC RX 250: Performed by: NURSE ANESTHETIST, CERTIFIED REGISTERED

## 2019-07-26 PROCEDURE — 25000125 ZZHC RX 250: Performed by: ANESTHESIOLOGY

## 2019-07-26 PROCEDURE — 25000128 H RX IP 250 OP 636: Performed by: ANESTHESIOLOGY

## 2019-07-26 RX ORDER — SODIUM CHLORIDE, SODIUM LACTATE, POTASSIUM CHLORIDE, CALCIUM CHLORIDE 600; 310; 30; 20 MG/100ML; MG/100ML; MG/100ML; MG/100ML
500 INJECTION, SOLUTION INTRAVENOUS CONTINUOUS
Status: DISCONTINUED | OUTPATIENT
Start: 2019-07-26 | End: 2019-07-26 | Stop reason: HOSPADM

## 2019-07-26 RX ORDER — KETOROLAC TROMETHAMINE 15 MG/ML
15 INJECTION, SOLUTION INTRAMUSCULAR; INTRAVENOUS ONCE
Status: COMPLETED | OUTPATIENT
Start: 2019-07-26 | End: 2019-07-26

## 2019-07-26 RX ADMIN — SODIUM CHLORIDE, POTASSIUM CHLORIDE, SODIUM LACTATE AND CALCIUM CHLORIDE 500 ML: 600; 310; 30; 20 INJECTION, SOLUTION INTRAVENOUS at 06:17

## 2019-07-26 RX ADMIN — LORATADINE 10 MG: 10 TABLET ORAL at 07:43

## 2019-07-26 RX ADMIN — DULOXETINE HYDROCHLORIDE 60 MG: 60 CAPSULE, DELAYED RELEASE ORAL at 07:43

## 2019-07-26 RX ADMIN — GABAPENTIN 300 MG: 300 CAPSULE ORAL at 07:43

## 2019-07-26 RX ADMIN — METHOHEXITAL SODIUM 120 MG: 500 INJECTION, POWDER, LYOPHILIZED, FOR SOLUTION INTRAMUSCULAR; INTRAVENOUS; RECTAL at 06:39

## 2019-07-26 RX ADMIN — ACETAMINOPHEN 650 MG: 325 TABLET, FILM COATED ORAL at 07:42

## 2019-07-26 RX ADMIN — KETOROLAC TROMETHAMINE 15 MG: 15 INJECTION, SOLUTION INTRAMUSCULAR; INTRAVENOUS at 07:08

## 2019-07-26 RX ADMIN — PANTOPRAZOLE SODIUM 40 MG: 40 TABLET, DELAYED RELEASE ORAL at 07:43

## 2019-07-26 RX ADMIN — LAMOTRIGINE 200 MG: 100 TABLET ORAL at 07:43

## 2019-07-26 RX ADMIN — LAMOTRIGINE 100 MG: 100 TABLET ORAL at 14:05

## 2019-07-26 RX ADMIN — FLUTICASONE FUROATE AND VILANTEROL TRIFENATATE 1 PUFF: 200; 25 POWDER RESPIRATORY (INHALATION) at 07:43

## 2019-07-26 RX ADMIN — LIDOCAINE HYDROCHLORIDE 0.3 ML: 10 INJECTION, SOLUTION EPIDURAL; INFILTRATION; INTRACAUDAL; PERINEURAL at 06:18

## 2019-07-26 RX ADMIN — SUCCINYLCHOLINE CHLORIDE 100 MG: 20 INJECTION, SOLUTION INTRAMUSCULAR; INTRAVENOUS; PARENTERAL at 06:39

## 2019-07-26 RX ADMIN — GABAPENTIN 300 MG: 300 CAPSULE ORAL at 14:05

## 2019-07-26 ASSESSMENT — ACTIVITIES OF DAILY LIVING (ADL)
HYGIENE/GROOMING: INDEPENDENT
LAUNDRY: WITH SUPERVISION
DRESS: INDEPENDENT
ORAL_HYGIENE: INDEPENDENT

## 2019-07-26 NOTE — ANESTHESIA PREPROCEDURE EVALUATION
Anesthesia Pre-Procedure Evaluation    Patient: Nikky Hancock   MRN: [id number] : 1965          Preoperative Diagnosis: * No surgery found *        Past Medical History:   Diagnosis Date     Alcohol abuse, in remission      Alcohol addiction (H)      Anxiety      Asthma      Bipolar 2 disorder (H)      Depressive disorder      GERD (gastroesophageal reflux disease)      TANYA (obstructive sleep apnea)      Protein S deficiency (H)      Pulmonary embolism (H)      Past Surgical History:   Procedure Laterality Date     BYPASS GASTRIC DUODENAL SWITCH       C NONSPECIFIC PROCEDURE      c/s, gastric bypass , nicolas     CHOLECYSTECTOMY       GYN SURGERY         Anesthesia Evaluation     .             ROS/MED HX    ENT/Pulmonary:     (+)sleep apnea, asthma uses CPAP , . .    Neurologic:       Cardiovascular:     (+) ----. : . . . :. . Previous cardiac testing date:results:date: results:ECG reviewed date:2019 results:NSR date: results:          METS/Exercise Tolerance:     Hematologic: Comments: DVT/PE in past    Protein S deficiency    (+) History of blood clots Other Hematologic Disorder-      Musculoskeletal:         GI/Hepatic:     (+) GERD Other GI/Hepatic s/p gastric bypass      Renal/Genitourinary:         Endo: Comment: BMI 45    (+) Obesity, .      Psychiatric:     (+) psychiatric history bipolar, anxiety and depression (EtOHism)      Infectious Disease:         Malignancy:         Other:                          Physical Exam  Normal systems: dental    Airway   Mallampati: II  TM distance: >3 FB  Neck ROM: full    Dental     Cardiovascular   Rhythm and rate: regular      Pulmonary    breath sounds clear to auscultation            Lab Results   Component Value Date    WBC 9.5 2019    HGB 12.9 2019    HCT 38.4 2019     2019     2019    POTASSIUM 3.9 2019    CHLORIDE 107 2019    CO2 29 2019    BUN 6 (L) 2019    CR 0.58 2019    GLC 96  "07/23/2019    STACIE 8.6 07/23/2019    PHOS 3.6 04/01/2011    MAG 2.0 04/01/2011    ALBUMIN 3.2 (L) 03/29/2011    PROTTOTAL 6.1 (L) 03/29/2011    ALT 8 03/29/2011    AST 28 03/29/2011    GGT 16 09/12/2010    ALKPHOS 46 03/29/2011    BILITOTAL <0.1 (L) 03/29/2011    PTT 32 08/05/2010    INR 1.65 (H) 07/25/2019    TSH 3.60 07/23/2019    HCG Negative 03/29/2011    HCGS Negative 07/23/2019       Preop Vitals  BP Readings from Last 3 Encounters:   07/26/19 124/84   10/01/14 105/74   03/04/13 (!) 128/91    Pulse Readings from Last 3 Encounters:   07/26/19 71   05/09/12 84   04/10/12 84      Resp Readings from Last 3 Encounters:   07/26/19 16   10/01/14 22   03/04/13 16    SpO2 Readings from Last 3 Encounters:   07/26/19 97%   10/01/14 97%   03/04/13 97%      Temp Readings from Last 1 Encounters:   07/26/19 36.7  C (98.1  F) (Oral)    Ht Readings from Last 1 Encounters:   07/23/19 1.549 m (5' 1\")      Wt Readings from Last 1 Encounters:   07/23/19 107.1 kg (236 lb 1.6 oz)    Estimated body mass index is 44.61 kg/m  as calculated from the following:    Height as of this encounter: 1.549 m (5' 1\").    Weight as of this encounter: 107.1 kg (236 lb 1.6 oz).       Anesthesia Plan      History & Physical Review  History and physical reviewed and following examination; no interval change.    ASA Status:  3 .    NPO Status:  > 8 hours    Plan for General (Mask ventilation) with Intravenous induction.   PONV prophylaxis:  Ondansetron (or other 5HT-3)  Patient with amnesia to treatment, but awoke very shortly after end of electrical seizure (within 2 minutes).  Consider increasing methohexital dose during future treatments to ensure amnesia        Postoperative Care      Consents  Anesthetic plan, risks, benefits and alternatives discussed with:  Patient..                 Wilbur Medina MD  "

## 2019-07-26 NOTE — PROGRESS NOTES
0728hr - Pt transferred kimberly k to floor in w/chair by CNA.  No needs/concerns voiced at time of transfer.

## 2019-07-26 NOTE — DISCHARGE INSTRUCTIONS
Behavioral Discharge Planning and Instructions    Summary: Admitted to inpatient behavioral health due to worsening depression.     Main Diagnosis:  Bipolar 2 disorder, current episode depressed without psychosis. Generalized anxiety disorder.     Major Treatments, Procedures and Findings: Electroconvulsive Therapy, medication adjustment.     Symptoms to Report: feeling more aggressive, increased confusion, losing more sleep, mood getting worse or thoughts of suicide    Lifestyle Adjustment: Please do not drive for one week following your last ECT treatment. Follow through on all treatment recommendations from you doctor and other care providers. Develop and follow a safety plan. Your safety plan is your contract with yourself to keep you safe in a crisis.    Psychiatry Follow-up:   [facility]    You have an appointment on July 30th at 3:30 p.m. With Dr. Adalid morgan after 4pm the day before ECT  Resume lithium once ECT is finished.  Let Dr. Cordoba know that it is on hold until after ECT  No driving until one week after ECT is completed.    Resources:   Crisis Intervention: 238.962.3301 or 731-026-2932 (TTY: 953.473.8338).  Call anytime for help.  National [place] on Mental Illness (www.[place].chriss.org): 189.751.5423 or 405-628-6510.  Alcoholics Anonymous (www.alcoholics-anonymous.org): Check your phone book for your local chapter.  Suicide Awareness Voices of Education (SAVE) (www.save.org): 943-742-TWWW (6883)  National Suicide Prevention Line (www.mentalhealthmn.org): 902-117-OYHP (5417)  Mental Health Consumer/Survivor Network of [place] (www.mhcsn.net): 476.882.7411 or 887-379-7482  Mental Health Association of [place] (www.mentalhealth.org): 995.628.1426 or 697-012-5964    General Medication Instructions:   See your medication sheet(s) for instructions.   Take all medicines as directed.  Make no changes unless your doctor suggests them.   Go to all your doctor visits.  Be sure to  have all your required lab tests. This way, your medicines can be refilled on time.  Do not use any drugs not prescribed by your doctor.  Avoid alcohol.

## 2019-07-26 NOTE — PLAN OF CARE
"Pt with full range affect. Social with peers and staff. Visible all shift. Pt has ECT tomorrow and reports feeling \"scared\" but wants to go through with it. Med compliant. Attended groups.   "

## 2019-07-26 NOTE — PROCEDURES
[facility] ECT Procedure Note     Nikky Hancock [id number]   54 year old 1965     Patient Status: Inpatient    Allergies   Allergen Reactions     Morphine Anaphylaxis       Weight:  236 lbs 1.6 oz              Diagnosis:   Major depression       Indications for ECT:   Medications ineffective       Pause for the Cause:     Right patient Yes   Right procedure/laterality settings: Yes   Right diagnosis Yes          Intra-Procedure Documentation:     Date:  7/26/2019  Time:  6:28 AM    ECT #    Treatment number this series: 1   Total treatment number: 1   Type of ECT:  Bilateral, standard    ECT Medications administered: See MAR and Anesthesia record         Clinical Narrative:     ECT was administered by Thymatron machine.  Pt has been medically cleared for procedure, consent signed. Side effects, Risks and benefits reviewed.    ECT Strip Summary:   Energy Level: 50 percent  Motor Seizure Duration: 30 seconds  EEG Seizure Duration: 30 seconds    Complications: No    Plan: 2nd ECT 7/29/19  Outpatient Psychiatrist: Dr Cordoba

## 2019-07-26 NOTE — ANESTHESIA POSTPROCEDURE EVALUATION
Patient: Nikky Hancock    * No procedures listed *    Diagnosis:* No pre-op diagnosis entered *  Diagnosis Additional Information: No value filed.    Anesthesia Type:  General    Note:  Anesthesia Post Evaluation    Patient location during evaluation: Bedside  Patient participation: Able to fully participate in evaluation  Level of consciousness: awake and alert  Pain management: adequate  Airway patency: patent  Cardiovascular status: acceptable  Respiratory status: acceptable  Hydration status: acceptable  PONV: none       Comments: Patient with amnesia to treatment, but awoke very shortly after end of electrical seizure (within 2 minutes).  Consider increasing methohexital dose during future treatments to ensure amnesia          Last vitals:  Vitals:    07/26/19 0715 07/26/19 0720 07/26/19 0725   BP: 124/77 127/84 117/84   Pulse: 80 73 76   Resp: 16 16 16   Temp:      SpO2: 98% 97% 97%         Electronically Signed By: Wilbur Medina MD  July 26, 2019  7:43 AM

## 2019-07-26 NOTE — PROGRESS NOTES
Patient denies suicidal ideation. Reviewed discharge instructions with patient and her . Discussed ECT instructions also. Patient and  verbalize understanding of them.  They also have a copy of AVS and ECT instructions. Discharged to home with her  at 1430.

## 2019-07-29 ENCOUNTER — ANESTHESIA (OUTPATIENT)
Dept: SURGERY | Facility: CLINIC | Age: 54
End: 2019-07-29
Payer: COMMERCIAL

## 2019-07-29 ENCOUNTER — ANESTHESIA EVENT (OUTPATIENT)
Dept: SURGERY | Facility: CLINIC | Age: 54
End: 2019-07-29

## 2019-07-29 PROCEDURE — 37000008 ZZH ANESTHESIA TECHNICAL FEE, 1ST 30 MIN

## 2019-07-29 PROCEDURE — 40000010 ZZH STATISTIC ANES STAT CODE-CRNA PER MINUTE

## 2019-07-29 PROCEDURE — 25000128 H RX IP 250 OP 636: Performed by: NURSE ANESTHETIST, CERTIFIED REGISTERED

## 2019-07-29 PROCEDURE — 25000125 ZZHC RX 250: Performed by: NURSE ANESTHETIST, CERTIFIED REGISTERED

## 2019-07-29 RX ADMIN — METHOHEXITAL SODIUM 140 MG: 500 INJECTION, POWDER, LYOPHILIZED, FOR SOLUTION INTRAMUSCULAR; INTRAVENOUS; RECTAL at 06:56

## 2019-07-29 RX ADMIN — SUCCINYLCHOLINE CHLORIDE 120 MG: 20 INJECTION, SOLUTION INTRAMUSCULAR; INTRAVENOUS; PARENTERAL at 06:56

## 2019-07-29 NOTE — ANESTHESIA PREPROCEDURE EVALUATION
Anesthesia Pre-Procedure Evaluation    Patient: Nikky Hancock   MRN: [id number] : 1965          Preoperative Diagnosis: * No surgery found *        Past Medical History:   Diagnosis Date     Alcohol abuse, in remission      Alcohol addiction (H)      Anxiety      Asthma      Bipolar 2 disorder (H)      Depressive disorder      GERD (gastroesophageal reflux disease)      TANYA (obstructive sleep apnea)      Protein S deficiency (H)      Pulmonary embolism (H)      Past Surgical History:   Procedure Laterality Date     BYPASS GASTRIC DUODENAL SWITCH       C NONSPECIFIC PROCEDURE      c/s, gastric bypass , nicolas     CHOLECYSTECTOMY       GYN SURGERY         Anesthesia Evaluation     .             ROS/MED HX    ENT/Pulmonary:     (+)sleep apnea, asthma uses CPAP , . .    Neurologic:       Cardiovascular:     (+) ----. : . . . :. . Previous cardiac testing date:results:date: results:ECG reviewed date:2019 results:NSR date: results:          METS/Exercise Tolerance:     Hematologic: Comments: DVT/PE in past    Protein S deficiency    (+) History of blood clots Other Hematologic Disorder-      Musculoskeletal:         GI/Hepatic:     (+) GERD Other GI/Hepatic s/p gastric bypass      Renal/Genitourinary:         Endo: Comment: BMI 45    (+) Obesity, .      Psychiatric:     (+) psychiatric history bipolar, anxiety and depression (EtOHism)      Infectious Disease:         Malignancy:         Other:                            Physical Exam  Normal systems: dental    Airway   Mallampati: II  TM distance: >3 FB  Neck ROM: full    Dental     Cardiovascular   Rhythm and rate: regular      Pulmonary    breath sounds clear to auscultation            Lab Results   Component Value Date    WBC 9.5 2019    HGB 12.9 2019    HCT 38.4 2019     2019     2019    POTASSIUM 3.9 2019    CHLORIDE 107 2019    CO2 29 2019    BUN 6 (L) 2019    CR 0.58 2019    GLC 96  "07/23/2019    STACIE 8.6 07/23/2019    PHOS 3.6 04/01/2011    MAG 2.0 04/01/2011    ALBUMIN 3.2 (L) 03/29/2011    PROTTOTAL 6.1 (L) 03/29/2011    ALT 8 03/29/2011    AST 28 03/29/2011    GGT 16 09/12/2010    ALKPHOS 46 03/29/2011    BILITOTAL <0.1 (L) 03/29/2011    PTT 32 08/05/2010    INR 1.64 (H) 07/26/2019    TSH 3.60 07/23/2019    HCG Negative 03/29/2011    HCGS Negative 07/23/2019       Preop Vitals  BP Readings from Last 3 Encounters:   07/29/19 130/79   07/26/19 124/67   10/01/14 105/74    Pulse Readings from Last 3 Encounters:   07/29/19 66   07/26/19 76   05/09/12 84      Resp Readings from Last 3 Encounters:   07/29/19 16   07/26/19 16   10/01/14 22    SpO2 Readings from Last 3 Encounters:   07/29/19 97%   07/26/19 100%   10/01/14 97%      Temp Readings from Last 1 Encounters:   07/29/19 36.4  C (97.6  F) (Temporal)    Ht Readings from Last 1 Encounters:   07/23/19 1.549 m (5' 1\")      Wt Readings from Last 1 Encounters:   07/23/19 107.1 kg (236 lb 1.6 oz)    Estimated body mass index is 44.61 kg/m  as calculated from the following:    Height as of 7/23/19: 1.549 m (5' 1\").    Weight as of 7/23/19: 107.1 kg (236 lb 1.6 oz).       Anesthesia Plan      History & Physical Review  History and physical reviewed and following examination; no interval change.    ASA Status:  3 .    NPO Status:  > 8 hours    Plan for General (Mask ventilation) with Intravenous induction.   PONV prophylaxis:  Ondansetron (or other 5HT-3)  Patient with amnesia to treatment, but awoke very shortly after end of electrical seizure (within 2 minutes).  Consider increasing methohexital dose during future treatments to ensure amnesia        Postoperative Care      Consents  Anesthetic plan, risks, benefits and alternatives discussed with:  Patient..                   ANAMARIA DEAN MD  "

## 2019-07-29 NOTE — ANESTHESIA CARE TRANSFER NOTE
Patient: Nikky Hancock    * No procedures listed *    Diagnosis: * No pre-op diagnosis entered *  Diagnosis Additional Information: No value filed.    Anesthesia Type:   General     Note:  Airway :Nasal Cannula  Patient transferred to:PACU  Comments: Native airway general anesthetic.  Patient hyperventilated with 100% oxygen via mask prior to treatment.   Anesthesia induced using patent peripheral IV.    Bite block placed between molars to protect teeth and tongue.     After induction of seizure patient mask ventilated with 100% oxygen until spontaneous respirations returned.     At time of handoff to PACU, patient exhibited spontaneous respirations, adequate tidal volumes, airway patent. Oxygen via nasal cannula at 3 liters per minute to PACU in cart with siderails up, connected to wall O2 in PACU. All monitors and alarms on and functioning. Report given to PACU RN and questions answered. Handoff Report: Identifed the Patient, Identified the Reponsible Provider, Reviewed the pertinent medical history, Discussed the surgical course, Reviewed Intra-OP anesthesia mangement and issues during anesthesia, Set expectations for post-procedure period and Allowed opportunity for questions and acknowledgement of understanding      Vitals: (Last set prior to Anesthesia Care Transfer)    CRNA VITALS  7/29/2019 0634 - 7/29/2019 0705      7/29/2019             Pulse:  100    SpO2:  100 %    Resp Rate (set):  10                Electronically Signed By: DARNELL Aguilar CRNA  July 29, 2019  7:05 AM

## 2019-07-29 NOTE — ANESTHESIA POSTPROCEDURE EVALUATION
Patient: Nikky Hancock    * No procedures listed *    Diagnosis:* No pre-op diagnosis entered *  Diagnosis Additional Information: No value filed.    Anesthesia Type:  General    Note:  Anesthesia Post Evaluation    Patient location during evaluation: PACU  Patient participation: Able to fully participate in evaluation  Level of consciousness: awake  Pain management: adequate  Airway patency: patent  Cardiovascular status: acceptable  Respiratory status: acceptable  Hydration status: acceptable  PONV: none     Anesthetic complications: None          Last vitals:  Vitals:    07/29/19 0705 07/29/19 0710 07/29/19 0715   BP: (!) 152/103 (!) 152/93 154/88   Pulse: 86 70 73   Resp: 28 20 16   Temp:      SpO2: 96% 99% 99%         Electronically Signed By: ANAMARIA DEAN MD  July 29, 2019  7:20 AM

## 2019-07-31 ENCOUNTER — ANESTHESIA (OUTPATIENT)
Dept: SURGERY | Facility: CLINIC | Age: 54
End: 2019-07-31

## 2019-07-31 ENCOUNTER — HOSPITAL ENCOUNTER (OUTPATIENT)
Dept: SURGERY | Facility: CLINIC | Age: 54
Discharge: HOME | End: 2019-07-31
Attending: PSYCHIATRY & NEUROLOGY | Admitting: PSYCHIATRY & NEUROLOGY
Payer: COMMERCIAL

## 2019-07-31 ENCOUNTER — ANESTHESIA EVENT (OUTPATIENT)
Dept: SURGERY | Facility: CLINIC | Age: 54
End: 2019-07-31

## 2019-07-31 VITALS
SYSTOLIC BLOOD PRESSURE: 149 MMHG | OXYGEN SATURATION: 96 % | DIASTOLIC BLOOD PRESSURE: 89 MMHG | HEART RATE: 59 BPM | RESPIRATION RATE: 17 BRPM

## 2019-07-31 DIAGNOSIS — F31.81 BIPOLAR 2 DISORDER (H): ICD-10-CM

## 2019-07-31 DIAGNOSIS — F33.2 SEVERE RECURRENT MAJOR DEPRESSION WITHOUT PSYCHOTIC FEATURES (H): Primary | ICD-10-CM

## 2019-07-31 PROCEDURE — 90870 ELECTROCONVULSIVE THERAPY: CPT

## 2019-07-31 PROCEDURE — 37000008 ZZH ANESTHESIA TECHNICAL FEE, 1ST 30 MIN

## 2019-07-31 PROCEDURE — 25000128 H RX IP 250 OP 636: Performed by: PSYCHIATRY & NEUROLOGY

## 2019-07-31 PROCEDURE — 40000010 ZZH STATISTIC ANES STAT CODE-CRNA PER MINUTE

## 2019-07-31 PROCEDURE — 25000125 ZZHC RX 250: Performed by: NURSE ANESTHETIST, CERTIFIED REGISTERED

## 2019-07-31 PROCEDURE — 25800030 ZZH RX IP 258 OP 636: Performed by: ANESTHESIOLOGY

## 2019-07-31 PROCEDURE — 25000128 H RX IP 250 OP 636: Performed by: NURSE ANESTHETIST, CERTIFIED REGISTERED

## 2019-07-31 RX ORDER — KETOROLAC TROMETHAMINE 15 MG/ML
15 INJECTION, SOLUTION INTRAMUSCULAR; INTRAVENOUS
Status: DISCONTINUED | OUTPATIENT
Start: 2019-07-31 | End: 2019-08-01 | Stop reason: HOSPADM

## 2019-07-31 RX ORDER — KETOROLAC TROMETHAMINE 15 MG/ML
15 INJECTION, SOLUTION INTRAMUSCULAR; INTRAVENOUS
Status: CANCELLED
Start: 2019-07-31

## 2019-07-31 RX ORDER — SODIUM CHLORIDE, SODIUM LACTATE, POTASSIUM CHLORIDE, CALCIUM CHLORIDE 600; 310; 30; 20 MG/100ML; MG/100ML; MG/100ML; MG/100ML
500 INJECTION, SOLUTION INTRAVENOUS CONTINUOUS
Status: DISCONTINUED | OUTPATIENT
Start: 2019-07-31 | End: 2019-08-01 | Stop reason: HOSPADM

## 2019-07-31 RX ADMIN — SUCCINYLCHOLINE CHLORIDE 120 MG: 20 INJECTION, SOLUTION INTRAMUSCULAR; INTRAVENOUS; PARENTERAL at 06:35

## 2019-07-31 RX ADMIN — SODIUM CHLORIDE, POTASSIUM CHLORIDE, SODIUM LACTATE AND CALCIUM CHLORIDE 500 ML: 600; 310; 30; 20 INJECTION, SOLUTION INTRAVENOUS at 06:20

## 2019-07-31 RX ADMIN — METHOHEXITAL SODIUM 140 MG: 500 INJECTION, POWDER, LYOPHILIZED, FOR SOLUTION INTRAMUSCULAR; INTRAVENOUS; RECTAL at 06:35

## 2019-07-31 RX ADMIN — KETOROLAC TROMETHAMINE 15 MG: 15 INJECTION, SOLUTION INTRAMUSCULAR; INTRAVENOUS at 06:20

## 2019-07-31 NOTE — ANESTHESIA POSTPROCEDURE EVALUATION
Patient: Nikky Hancock    * No procedures listed *    Diagnosis:* No pre-op diagnosis entered *  Diagnosis Additional Information: No value filed.    Anesthesia Type:  General    Note:  Anesthesia Post Evaluation    Patient location during evaluation: PACU  Patient participation: Able to fully participate in evaluation  Level of consciousness: awake and alert  Pain management: adequate  Airway patency: patent  Cardiovascular status: acceptable  Respiratory status: acceptable and unassisted  Hydration status: acceptable  PONV: none             Last vitals:  Vitals:    07/31/19 0655 07/31/19 0700 07/31/19 0705   BP: (!) 152/79 (!) 135/114 (!) 149/90   Pulse:  60    Resp: 19 15 12   SpO2: 100% 98% 98%         Electronically Signed By: Julieta Ferguson MD  July 31, 2019  7:09 AM

## 2019-07-31 NOTE — ANESTHESIA POSTPROCEDURE EVALUATION
Patient: Nikky Hancock    * No procedures listed *    Diagnosis:* No pre-op diagnosis entered *  Diagnosis Additional Information: No value filed.    Anesthesia Type:  No value filed.    Note:  Anesthesia Post Evaluation    Patient location during evaluation: PACU  Patient participation: Able to fully participate in evaluation  Level of consciousness: awake and alert  Pain management: adequate  Airway patency: patent  Cardiovascular status: acceptable  Respiratory status: acceptable and unassisted  Hydration status: acceptable  PONV: none             Last vitals:  Vitals:    07/31/19 0645 07/31/19 0650 07/31/19 0655   BP: (!) 158/98 (!) 154/98 (!) 152/79   Pulse:      Resp: 15 14 19   SpO2: 97% 99% 100%         Electronically Signed By: Julieta Ferguson MD  July 31, 2019  6:58 AM

## 2019-07-31 NOTE — ANESTHESIA PREPROCEDURE EVALUATION
Anesthesia Pre-Procedure Evaluation    Patient: Nikky Hancock   MRN: [id number] : 1965          Preoperative Diagnosis: * No surgery found *        Past Medical History:   Diagnosis Date     Alcohol abuse, in remission      Alcohol addiction (H)      Anxiety      Asthma      Bipolar 2 disorder (H)      Depressive disorder      GERD (gastroesophageal reflux disease)      TANYA (obstructive sleep apnea)      Protein S deficiency (H)      Pulmonary embolism (H)      Past Surgical History:   Procedure Laterality Date     BYPASS GASTRIC DUODENAL SWITCH       C NONSPECIFIC PROCEDURE      c/s, gastric bypass , nicolas     CHOLECYSTECTOMY       GYN SURGERY         Anesthesia Evaluation     .             ROS/MED HX    ENT/Pulmonary:     (+)sleep apnea, asthma uses CPAP , . .    Neurologic:       Cardiovascular:     (+) ----. : . . . :. . Previous cardiac testing date:results:date: results:ECG reviewed date:2019 results:NSR date: results:          METS/Exercise Tolerance:     Hematologic: Comments: DVT/PE in past    Protein S deficiency    (+) History of blood clots Other Hematologic Disorder-      Musculoskeletal:         GI/Hepatic:     (+) GERD Other GI/Hepatic s/p gastric bypass      Renal/Genitourinary:         Endo: Comment: BMI 45    (+) Obesity, .      Psychiatric:     (+) psychiatric history bipolar, anxiety and depression (EtOHism)      Infectious Disease:         Malignancy:         Other:                            Physical Exam  Normal systems: dental    Airway   Mallampati: II  TM distance: >3 FB  Neck ROM: full    Dental     Cardiovascular   Rhythm and rate: regular      Pulmonary    breath sounds clear to auscultation            Lab Results   Component Value Date    WBC 9.5 2019    HGB 12.9 2019    HCT 38.4 2019     2019     2019    POTASSIUM 3.9 2019    CHLORIDE 107 2019    CO2 29 2019    BUN 6 (L) 2019    CR 0.58 2019    GLC 96  "07/23/2019    STACIE 8.6 07/23/2019    PHOS 3.6 04/01/2011    MAG 2.0 04/01/2011    ALBUMIN 3.2 (L) 03/29/2011    PROTTOTAL 6.1 (L) 03/29/2011    ALT 8 03/29/2011    AST 28 03/29/2011    GGT 16 09/12/2010    ALKPHOS 46 03/29/2011    BILITOTAL <0.1 (L) 03/29/2011    PTT 32 08/05/2010    INR 1.64 (H) 07/26/2019    TSH 3.60 07/23/2019    HCG Negative 03/29/2011    HCGS Negative 07/23/2019       Preop Vitals  BP Readings from Last 3 Encounters:   07/31/19 (!) 152/79   07/29/19 143/89   07/26/19 124/67    Pulse Readings from Last 3 Encounters:   07/31/19 61   07/29/19 59   07/26/19 76      Resp Readings from Last 3 Encounters:   07/31/19 19   07/29/19 12   07/26/19 16    SpO2 Readings from Last 3 Encounters:   07/31/19 100%   07/29/19 98%   07/26/19 100%      Temp Readings from Last 1 Encounters:   07/29/19 36.9  C (98.5  F) (Temporal)    Ht Readings from Last 1 Encounters:   07/23/19 1.549 m (5' 1\")      Wt Readings from Last 1 Encounters:   07/23/19 107.1 kg (236 lb 1.6 oz)    Estimated body mass index is 44.61 kg/m  as calculated from the following:    Height as of 7/23/19: 1.549 m (5' 1\").    Weight as of 7/23/19: 107.1 kg (236 lb 1.6 oz).       Anesthesia Plan      History & Physical Review  History and physical reviewed and following examination; no interval change.    ASA Status:  3 .    NPO Status:  > 8 hours    Plan for General (Mask ventilation) with Intravenous induction.   PONV prophylaxis:  Ondansetron (or other 5HT-3)  Patient with amnesia to treatment, but awoke very shortly after end of electrical seizure (within 2 minutes).  Consider increasing methohexital dose during future treatments to ensure amnesia        Postoperative Care      Consents  Anesthetic plan, risks, benefits and alternatives discussed with:  Patient..                   Julieta Ferguson MD  "

## 2019-07-31 NOTE — ANESTHESIA POSTPROCEDURE EVALUATION
Patient: Nikky Hancock    * No procedures listed *    Diagnosis:* No pre-op diagnosis entered *  Diagnosis Additional Information: No value filed.    Anesthesia Type:  No value filed.    Note:  Anesthesia Post Evaluation    Patient location during evaluation: PACU  Patient participation: Able to fully participate in evaluation  Level of consciousness: awake and alert  Pain management: adequate  Airway patency: patent  Cardiovascular status: acceptable  Respiratory status: acceptable and unassisted  Hydration status: acceptable  PONV: none             Last vitals:  Vitals:    07/31/19 0616 07/31/19 0645 07/31/19 0650   BP:  (!) 158/98 (!) 154/98   Pulse: 61     Resp: 20 15 14   SpO2: 98% 97% 99%         Electronically Signed By: Julieta Ferguson MD  July 31, 2019  6:57 AM

## 2019-07-31 NOTE — PROCEDURES
[facility] ECT Procedure Note     Nikky Hancock [id number]   54 year old 1965     Patient Status: Outpatient    Allergies   Allergen Reactions     Morphine Anaphylaxis       Weight:  0 lbs 0 oz              Diagnosis:   Major depression       Indications for ECT:   Medications ineffective       Pause for the Cause:     Right patient Yes   Right procedure/laterality settings: Yes   Right diagnosis Yes          Intra-Procedure Documentation:     Date:  7/31/2019  Time:  6:17 AM    ECT #    Treatment number this series: 3   Total treatment number: 3   Type of ECT:  Bilateral, standard    ECT Medications administered: See MAR and Anesthesia record         Clinical Narrative:     ECT was administered by Thymatron machine.  Pt has been medically cleared for procedure, consent signed. Side effects, Risks and benefits reviewed.    ECT Strip Summary:   Energy Level: 70 percent  Motor Seizure Duration: 30 seconds  EEG Seizure Duration: 30 seconds    Complications: No    Plan: 4th ECT 8/2/19  Outpatient Psychiatrist: Dr Cordoba

## 2019-08-05 ENCOUNTER — HOSPITAL ENCOUNTER (OUTPATIENT)
Dept: SURGERY | Facility: CLINIC | Age: 54
Discharge: HOME | End: 2019-08-05
Attending: PSYCHIATRY & NEUROLOGY | Admitting: PSYCHIATRY & NEUROLOGY
Payer: COMMERCIAL

## 2019-08-05 ENCOUNTER — ANESTHESIA (OUTPATIENT)
Dept: SURGERY | Facility: CLINIC | Age: 54
End: 2019-08-05

## 2019-08-05 ENCOUNTER — ANESTHESIA EVENT (OUTPATIENT)
Dept: SURGERY | Facility: CLINIC | Age: 54
End: 2019-08-05

## 2019-08-05 VITALS
DIASTOLIC BLOOD PRESSURE: 65 MMHG | TEMPERATURE: 98.4 F | SYSTOLIC BLOOD PRESSURE: 132 MMHG | OXYGEN SATURATION: 99 % | HEART RATE: 65 BPM | RESPIRATION RATE: 8 BRPM

## 2019-08-05 DIAGNOSIS — F31.81 BIPOLAR 2 DISORDER (H): ICD-10-CM

## 2019-08-05 DIAGNOSIS — F33.2 SEVERE RECURRENT MAJOR DEPRESSION WITHOUT PSYCHOTIC FEATURES (H): Primary | ICD-10-CM

## 2019-08-05 PROCEDURE — 90870 ELECTROCONVULSIVE THERAPY: CPT

## 2019-08-05 PROCEDURE — 25000125 ZZHC RX 250: Performed by: NURSE ANESTHETIST, CERTIFIED REGISTERED

## 2019-08-05 PROCEDURE — 40000010 ZZH STATISTIC ANES STAT CODE-CRNA PER MINUTE

## 2019-08-05 PROCEDURE — 25000128 H RX IP 250 OP 636: Performed by: NURSE ANESTHETIST, CERTIFIED REGISTERED

## 2019-08-05 PROCEDURE — 25800030 ZZH RX IP 258 OP 636: Performed by: ANESTHESIOLOGY

## 2019-08-05 PROCEDURE — 25000128 H RX IP 250 OP 636: Performed by: PSYCHIATRY & NEUROLOGY

## 2019-08-05 PROCEDURE — 37000008 ZZH ANESTHESIA TECHNICAL FEE, 1ST 30 MIN

## 2019-08-05 RX ORDER — SODIUM CHLORIDE, SODIUM LACTATE, POTASSIUM CHLORIDE, CALCIUM CHLORIDE 600; 310; 30; 20 MG/100ML; MG/100ML; MG/100ML; MG/100ML
INJECTION, SOLUTION INTRAVENOUS CONTINUOUS
Status: DISCONTINUED | OUTPATIENT
Start: 2019-08-05 | End: 2019-08-06 | Stop reason: HOSPADM

## 2019-08-05 RX ORDER — KETOROLAC TROMETHAMINE 15 MG/ML
15 INJECTION, SOLUTION INTRAMUSCULAR; INTRAVENOUS
Status: DISCONTINUED | OUTPATIENT
Start: 2019-08-05 | End: 2019-08-06 | Stop reason: HOSPADM

## 2019-08-05 RX ORDER — KETOROLAC TROMETHAMINE 15 MG/ML
15 INJECTION, SOLUTION INTRAMUSCULAR; INTRAVENOUS
Status: CANCELLED
Start: 2019-08-05

## 2019-08-05 RX ADMIN — SUCCINYLCHOLINE CHLORIDE 120 MG: 20 INJECTION, SOLUTION INTRAMUSCULAR; INTRAVENOUS; PARENTERAL at 06:38

## 2019-08-05 RX ADMIN — KETOROLAC TROMETHAMINE 15 MG: 15 INJECTION, SOLUTION INTRAMUSCULAR; INTRAVENOUS at 06:20

## 2019-08-05 RX ADMIN — SODIUM CHLORIDE, POTASSIUM CHLORIDE, SODIUM LACTATE AND CALCIUM CHLORIDE: 600; 310; 30; 20 INJECTION, SOLUTION INTRAVENOUS at 06:22

## 2019-08-05 RX ADMIN — METHOHEXITAL SODIUM 140 MG: 500 INJECTION, POWDER, LYOPHILIZED, FOR SOLUTION INTRAMUSCULAR; INTRAVENOUS; RECTAL at 06:38

## 2019-08-05 ASSESSMENT — LIFESTYLE VARIABLES: TOBACCO_USE: 0

## 2019-08-05 NOTE — ANESTHESIA PREPROCEDURE EVALUATION
Anesthesia Pre-Procedure Evaluation    Patient: Nikky Hancock   MRN: [id number] : 1965          Preoperative Diagnosis: * No pre-op diagnosis entered *    * No procedures listed *    Past Medical History:   Diagnosis Date     Alcohol abuse, in remission      Alcohol addiction (H)      Anxiety      Asthma      Bipolar 2 disorder (H)      Depressive disorder      GERD (gastroesophageal reflux disease)      TANYA (obstructive sleep apnea)      Protein S deficiency (H)      Pulmonary embolism (H)      Past Surgical History:   Procedure Laterality Date     BYPASS GASTRIC DUODENAL SWITCH       C NONSPECIFIC PROCEDURE      c/s, gastric bypass , nicolas     CHOLECYSTECTOMY       GYN SURGERY         Anesthesia Evaluation     . Pt has had prior anesthetic.     No history of anesthetic complications          ROS/MED HX    ENT/Pulmonary:     (+)sleep apnea, asthma uses CPAP , . .   (-) tobacco use   Neurologic:       Cardiovascular:         METS/Exercise Tolerance:     Hematologic: Comments: Dvt/pe in the poast, protein s deficiency        Musculoskeletal:         GI/Hepatic:     (+) GERD       Renal/Genitourinary:         Endo:         Psychiatric:     (+) psychiatric history (etohy) anxiety and depression      Infectious Disease:         Malignancy:         Other:                          Physical Exam  Normal systems: dental    Airway   Mallampati: I  TM distance: >3 FB  Neck ROM: full    Dental     Cardiovascular   Rhythm and rate: regular and normal      Pulmonary    breath sounds clear to auscultation            Lab Results   Component Value Date    WBC 9.5 2019    HGB 12.9 2019    HCT 38.4 2019     2019     2019    POTASSIUM 3.9 2019    CHLORIDE 107 2019    CO2 29 2019    BUN 6 (L) 2019    CR 0.58 2019    GLC 96 2019    STACIE 8.6 2019    PHOS 3.6 2011    MAG 2.0 2011    ALBUMIN 3.2 (L) 2011    PROTTOTAL 6.1 (L)  "03/29/2011    ALT 8 03/29/2011    AST 28 03/29/2011    GGT 16 09/12/2010    ALKPHOS 46 03/29/2011    BILITOTAL <0.1 (L) 03/29/2011    PTT 32 08/05/2010    INR 1.64 (H) 07/26/2019    TSH 3.60 07/23/2019    HCG Negative 03/29/2011    HCGS Negative 07/23/2019       Preop Vitals  BP Readings from Last 3 Encounters:   08/05/19 114/79   07/31/19 (!) 149/89   07/29/19 143/89    Pulse Readings from Last 3 Encounters:   08/05/19 73   07/31/19 59   07/29/19 59      Resp Readings from Last 3 Encounters:   08/05/19 16   07/31/19 17   07/29/19 12    SpO2 Readings from Last 3 Encounters:   08/05/19 98%   07/31/19 96%   07/29/19 98%      Temp Readings from Last 1 Encounters:   08/05/19 36.7  C (98.1  F) (Temporal)    Ht Readings from Last 1 Encounters:   07/23/19 1.549 m (5' 1\")      Wt Readings from Last 1 Encounters:   07/23/19 107.1 kg (236 lb 1.6 oz)    Estimated body mass index is 44.61 kg/m  as calculated from the following:    Height as of 7/23/19: 1.549 m (5' 1\").    Weight as of 7/23/19: 107.1 kg (236 lb 1.6 oz).       Anesthesia Plan      History & Physical Review  History and physical reviewed and following examination; no interval change.    ASA Status:  2 .        Plan for General with Intravenous induction.   PONV prophylaxis:  Ondansetron (or other 5HT-3)       Postoperative Care      Consents  Anesthetic plan, risks, benefits and alternatives discussed with:  Patient..                 Ashely Hurst  "

## 2019-08-05 NOTE — ANESTHESIA POSTPROCEDURE EVALUATION
Patient: Nikky Hancock    * No procedures listed *    Diagnosis:* No pre-op diagnosis entered *  Diagnosis Additional Information: No value filed.    Anesthesia Type:  General    Note:  Anesthesia Post Evaluation    Patient location during evaluation: PACU  Patient participation: Able to fully participate in evaluation  Level of consciousness: awake  Pain management: adequate  Airway patency: patent  Cardiovascular status: acceptable  Respiratory status: acceptable  Hydration status: acceptable  PONV: none     Anesthetic complications: None          Last vitals:  Vitals:    08/05/19 0710 08/05/19 0715 08/05/19 0720   BP: 129/59 (!) 133/90 132/65   Pulse: 77  65   Resp: 12 18 8   Temp: 36.9  C (98.4  F)     SpO2: 98% 97% 99%         Electronically Signed By: Ashely Hurst  August 5, 2019  3:37 PM

## 2019-08-05 NOTE — PROCEDURES
[facility] ECT Procedure Note     Nikky Hancock [id number]   54 year old 1965     Patient Status: Outpatient    Allergies   Allergen Reactions     Morphine Anaphylaxis       Weight:  0 lbs 0 oz              Diagnosis:   Major depression       Indications for ECT:   Medications ineffective       Pause for the Cause:     Right patient Yes   Right procedure/laterality settings: Yes   Right diagnosis Yes          Intra-Procedure Documentation:     Date:  8/5/2019  Time:  6:17 AM    ECT #    Treatment number this series: 4   Total treatment number: 4   Type of ECT:  Bilateral, standard    ECT Medications administered: See MAR and Anesthesia record         Clinical Narrative:     ECT was administered by Thymatron machine.  Pt has been medically cleared for procedure, consent signed. Side effects, Risks and benefits reviewed.    ECT Strip Summary:   Energy Level: 80 percent  Motor Seizure Duration: 30 seconds  EEG Seizure Duration: 30 seconds    Complications: No    Plan: 5th ECT 8/7/19  Outpatient Psychiatrist: Dr Cordoba

## 2019-08-05 NOTE — DISCHARGE SUMMARY
[facility]    Discharge Summary  Adult Psychiatry    Date of Admission:  7/23/2019  Date of Discharge:  7/26/2019  2:48 PM  Discharging Provider: Carol Cherry MD      Discharge Diagnoses   1.  Bipolar 2 disorder, current episode depressed without psychosis.   2.  Generalized anxiety disorder.   3.  Rule out panic disorder.   4.  Alcohol use disorder in remission.   5.  Gastroesophageal reflux disease.   6.  Protein S deficiency.   7.  Mild intermittent asthma.   8.  History of falls with no concussion.   9.  Obstructive sleep apnea.  10.  Personal history of pulmonary embolism and DVT.    History of Present Illness   Nikky Hancock is a 54-year-old woman who is 24 years into her second marriage.  She has 2 stepdaughters and 1 daughter resides with her  in [place].  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 She reports depression symptom profile positive for depressed mood, anhedonia, feelings of hopelessness, helplessness and worthlessness, in addition to excessive ruminative worry.  She states she experiences panic attacks characterized by palpitations, ruminations and butterflies in her belly.  She states she feels she still sleeps okay because she takes Seroquel at bedtime to facilitate sleep.  She also has obstructive sleep apnea and does use a CPAP machine.  She reports that she has become socially isolated and has not been leaving her home.  She has been experiencing crying spells and lacks energy to do things.  She states she is not as motivated as she used to be and she has also been finding it more difficult to socialize.  She was referred for electroconvulsive therapy on account of worsening depression by her outpatient psychiatrist, Dr. Lg Cordoba.  Information was gathered through direct patient contact as well as chart review.  She is admitted as a voluntary patient. For more details, I refer the reader to  the initial psychiatric assessment documented on admission by Carol Cherry MD in addition to the history and physical examination completed on 7/24/2019 by Nuno Mcdonald MD    Hospital Course   Nikky Hancock was admitted on 7/23/2019.  The patient was introduced to the milieu and encouraged to ventilate her stressors.  She was maintained on prior to admission medications and she was seen by the hospitalist service who cleared her for electroconvulsive therapy.  She displayed a bright disposition even though she reported florid symptoms of depression and expressed distress on account of conflict with her daughter who was not very supportive with respect to her mental illness.  She described how her daughter was preventing her from seeing her grandchildren due to her mental illness but in the course of her hospitalization she was able to educate her daughter about the familial predisposition to mental illness and the fact that she could not have done anything to prevent herself from becoming ill with bipolar disorder.  They were advised of the benefits of participating in JOVITA meetings and she verbalized understanding.  Following her first ECT treatment, the patient reported improvement in her mood and decided to continue her subsequent treatments as an outpatient.  At the point of discharge she was devoid of self-harm thoughts plans or intent and she plan on following up with her outpatient psychiatrist.  She was encouraged to utilize behavioral activation strategies to sustain the benefits obtained from ECT.  Carol Cherry MD    Significant Results and Procedures   Please see below.    Unresulted Labs Ordered in the Past 30 Days of this Admission     No orders found from 6/23/2019 to 7/24/2019.          Code Status   Full Code    Primary Care Physician   Starla Varghese    Physical Exam   Appearance:  awake, alert, adequately groomed and casually dressed  Attitude:   cooperative  Eye Contact:  good  Mood:  good  Affect:  appropriate and in normal range and mood congruent  Speech:  clear, coherent and normal prosody  Psychomotor Behavior:  no evidence of tardive dyskinesia, dystonia, or tics and intact station, gait and muscle tone  Thought Process:  logical, linear and goal oriented  Associations:  no loose associations  Thought Content:  no evidence of suicidal ideation or homicidal ideation and no evidence of psychotic thought  Insight:  good  Judgment:  intact  Oriented to:  time, person, and place  Attention Span and Concentration:  intact  Recent and Remote Memory:  intact  Language: Able to name objects and Able to repeat phrases  Fund of Knowledge: appropriate  Muscle Strength and Tone: normal  Gait and Station: Normal    Time Spent on this Encounter   Carol PIERCE, personally saw the patient today and spent greater than 30 minutes discharging this patient.    Discharge Disposition   Discharged to home  Condition at discharge: Stable    PSYCHIATRY FOLLOW-UP:  [facility]    You have an appointment on July 30th at 3:30 p.m. With Dr. Adalid morgan after 4pm the day before ECT  Resume lithium once ECT is finished.  Let Dr. Cordoba know that it is on hold until after ECT  No driving until one week after ECT is completed.      Consultations This Hospital Stay   HOSPITALIST IP CONSULT  PHARMACY TO DOSE WARFARIN  PHARMACY TO DOSE WARFARIN    Discharge Orders   No discharge procedures on file.  Discharge Medications   Discharge Medication List as of 7/26/2019  2:49 PM      CONTINUE these medications which have NOT CHANGED    Details   ADVAIR DISKUS 500-50 MCG/DOSE IN AEPB 1 PUFF BY INHALATION EVERY day, Historical      benzonatate (TESSALON) 200 MG capsule Take 1 capsule (200 mg) by mouth 3 times daily as needed for cough, Disp-21 capsule, R-0, Normal      CAMPRAL 333 MG OR TBEC 2 TABLETS ORALLY 3 TIMES DAILY, Oral, Disp-180,  R-0, Historical      CLARITIN 10 MG PO TABS 1 TABLET ORALLY DAILY, Oral, Disp-90, R-3, Historical      clonazePAM (KLONOPIN) 0.5 MG tablet Take 0.5 mg by mouth At Bedtime, Historical      diphenhydrAMINE (BENADRYL) 25 MG tablet Take 2 tablets by mouth every 6 hours as needed (swelling, itching)., 50 mg, Oral, EVERY 6 HOURS PRN Starting 3/4/2013, Until Discontinued, Disp-30 tablet, R-0, Normal      disulfiram (ANTABUSE) 250 MG tablet Take 500 mg by mouth daily., 500 mg, Oral, DAILY, Until Discontinued, Historical      divalproex (DEPAKOTE) 500 MG 24 hr tablet Take 2 tablets by mouth every evening., 1,000 mg, Oral, EVERY EVENING Starting 5/9/2012, Until Discontinued, Disp-30 tablet, R-0, E-Prescribe      DULoxetine (CYMBALTA) 60 MG capsule Take 1 capsule by mouth daily., 60 mg, Oral, DAILY Starting 5/9/2012, Until Discontinued, Disp-30 capsule, R-0, E-Prescribe      EPINEPHrine (EPIPEN) 0.3 MG/0.3ML injection Inject 0.3 mLs into the muscle once as needed for anaphylaxis for 1 dose., 0.3 mg, Intramuscular, ONCE PRN Starting 3/4/2013, Until Discontinued, Disp-2 each, R-3, Normal      ergocalciferol (ERGOCALCIFEROL) 50967 units capsule Take 50,000 Units by mouth once a week, Historical      FOLIC ACID 400 MCG PO TABS 1 TABLET ORALLY DAILY, Oral, Historical      gabapentin (NEURONTIN) 400 MG capsule Take 300 mg by mouth 3 times daily , Historical      HYDROcodone-acetaminophen (NORCO) 5-325 MG per tablet Take 1-2 tablets by mouth every 4 hours as needed for pain, Disp-15 tablet, R-0, Normal      ipratropium - albuterol 0.5 mg/2.5 mg/3 mL (DUONEB) 0.5-2.5 (3) MG/3ML nebulization Take 1 vial (3 mLs) by nebulization every 4 hours as needed for shortness of breath / dyspnea, Disp-1 Box, R-1, Normal      lamoTRIgine (LAMICTAL) 100 MG tablet Take 100 mg by mouth daily 200 mg every morning, 100 mg QHS, Historical      Lansoprazole (PREVACID PO) Take 30 mg by mouth every morning (before breakfast)., 30 mg, Oral, EVERY MORNING  BEFORE BREAKFAST, Until Discontinued, Historical      lithium ER (LITHOBID) 300 MG CR tablet Take 300 mg by mouth 2 times daily, Historical      mirtazapine (REMERON) 15 MG tablet Take 1 tablet by mouth At Bedtime., 15 mg, Oral, AT BEDTIME Starting 5/8/2012, Until Discontinued, Disp-30 tablet, R-0, E-Prescribe      MULTI-VITAMIN OR TABS 1 TABLET ORALLY DAILY, Oral, Disp-30, R-0, Historical      NALTREXONE HCL PO Take 50 mg by mouth daily., 50 mg, Oral, DAILY, Until Discontinued, Historical      pantoprazole (PROTONIX) 40 MG EC tablet Take 40 mg by mouth daily, Historical      QUEtiapine (SEROQUEL XR) 300 MG 24 hr tablet Take 1 tablet by mouth daily., 300 mg, Oral, DAILY Starting 5/9/2012, Until Discontinued, Disp-30 tablet, R-0, E-Prescribe      QUEtiapine (SEROQUEL) 300 MG tablet Take 300 mg by mouth At Bedtime, Historical      REQUIP 1 MG PO TABS 1 TABLET ORALLY AT BEDTIME, may take an additional 1 mg in evening as needed, Oral, Historical      TRAZODONE  MG OR TABS 200 MG = 2 TABLETS ORALLY AT BEDTIME, Oral, Disp-60, R-3, Historical      VENTOLIN  (90 BASE) MCG/ACT IN AERS 2 PUFFS BY INHALATION EVERY 4 HOURS AS NEEDED, Inhalation, Historical      VITAMIN B-12 1000 MCG/ML IJ SOLN 1000 MCG ONCE MONTHLY BY INTRAMUSCULAR INJECTION, Injection, Disp-3 MONTHS, R-3, Historical      warfarin (COUMADIN) 5 MG tablet Take 1 tablet by mouth daily., 1 tablet = 5 mg, Oral, DAILY Starting 5/4/2012, Until Discontinued, Disp-30 tablet, R-2, E-Prescribe           Allergies   Allergies   Allergen Reactions     Morphine Anaphylaxis     Data   Most Recent 3 CBC's:  Recent Labs   Lab Test 07/23/19  1655 10/01/14  0414 05/02/12  0905   WBC 9.5 9.7 7.1   HGB 12.9 14.0 12.0   MCV 93 92 92    402 388      Most Recent 3 BMP's:  Recent Labs   Lab Test 07/23/19  1655 10/01/14  0414 05/02/12  0905    141 142   POTASSIUM 3.9 3.5 3.6   CHLORIDE 107 107 106   CO2 29 27 24   BUN 6* 11 10   CR 0.58 0.76 0.56   ANIONGAP 4  7 11   STACIE 8.6 8.8 8.7   GLC 96 76 96     Most Recent 2 LFT's:No lab results found.   Panel:No lab results found.  Most Recent 6 Bacteria Isolates From Any Culture (See EPIC Reports for Culture Details):  Recent Labs   Lab Test 03/04/13  0545   CULT No Beta Streptococcus isolated     Most Recent TSH, T4 and A1c Labs:  Recent Labs   Lab Test 07/23/19  1655   TSH 3.60     Results for orders placed or performed during the hospital encounter of 10/01/14   XR Chest 2 Views    Narrative    XR CHEST 2 VW 10/1/2014 7:10 AM    HISTORY: Chest Pain, Shortness of Breath,            Impression    IMPRESSION: Negative.    TEQUILA FLETCHER MD

## 2019-08-07 ENCOUNTER — HOSPITAL ENCOUNTER (OUTPATIENT)
Dept: SURGERY | Facility: CLINIC | Age: 54
Discharge: HOME | End: 2019-08-07
Attending: PSYCHIATRY & NEUROLOGY | Admitting: PSYCHIATRY & NEUROLOGY
Payer: COMMERCIAL

## 2019-08-07 ENCOUNTER — ANESTHESIA EVENT (OUTPATIENT)
Dept: SURGERY | Facility: CLINIC | Age: 54
End: 2019-08-07

## 2019-08-07 ENCOUNTER — ANESTHESIA (OUTPATIENT)
Dept: SURGERY | Facility: CLINIC | Age: 54
End: 2019-08-07

## 2019-08-07 VITALS
RESPIRATION RATE: 10 BRPM | SYSTOLIC BLOOD PRESSURE: 122 MMHG | OXYGEN SATURATION: 94 % | TEMPERATURE: 99 F | HEART RATE: 64 BPM | DIASTOLIC BLOOD PRESSURE: 80 MMHG

## 2019-08-07 DIAGNOSIS — F31.81 BIPOLAR 2 DISORDER (H): ICD-10-CM

## 2019-08-07 DIAGNOSIS — F33.2 SEVERE RECURRENT MAJOR DEPRESSION WITHOUT PSYCHOTIC FEATURES (H): Primary | ICD-10-CM

## 2019-08-07 PROCEDURE — 25800030 ZZH RX IP 258 OP 636: Performed by: ANESTHESIOLOGY

## 2019-08-07 PROCEDURE — 40000010 ZZH STATISTIC ANES STAT CODE-CRNA PER MINUTE

## 2019-08-07 PROCEDURE — 90870 ELECTROCONVULSIVE THERAPY: CPT

## 2019-08-07 PROCEDURE — 25000128 H RX IP 250 OP 636: Performed by: NURSE ANESTHETIST, CERTIFIED REGISTERED

## 2019-08-07 PROCEDURE — 25000125 ZZHC RX 250: Performed by: NURSE ANESTHETIST, CERTIFIED REGISTERED

## 2019-08-07 PROCEDURE — 25800030 ZZH RX IP 258 OP 636: Performed by: NURSE ANESTHETIST, CERTIFIED REGISTERED

## 2019-08-07 PROCEDURE — 25000128 H RX IP 250 OP 636: Performed by: PSYCHIATRY & NEUROLOGY

## 2019-08-07 PROCEDURE — 37000008 ZZH ANESTHESIA TECHNICAL FEE, 1ST 30 MIN

## 2019-08-07 RX ORDER — KETOROLAC TROMETHAMINE 15 MG/ML
15 INJECTION, SOLUTION INTRAMUSCULAR; INTRAVENOUS
Status: DISCONTINUED | OUTPATIENT
Start: 2019-08-07 | End: 2019-08-08 | Stop reason: HOSPADM

## 2019-08-07 RX ORDER — SODIUM CHLORIDE, SODIUM LACTATE, POTASSIUM CHLORIDE, CALCIUM CHLORIDE 600; 310; 30; 20 MG/100ML; MG/100ML; MG/100ML; MG/100ML
500 INJECTION, SOLUTION INTRAVENOUS CONTINUOUS
Status: DISCONTINUED | OUTPATIENT
Start: 2019-08-07 | End: 2019-08-08 | Stop reason: HOSPADM

## 2019-08-07 RX ORDER — KETOROLAC TROMETHAMINE 15 MG/ML
15 INJECTION, SOLUTION INTRAMUSCULAR; INTRAVENOUS
Status: CANCELLED
Start: 2019-08-07

## 2019-08-07 RX ORDER — SODIUM CHLORIDE, SODIUM LACTATE, POTASSIUM CHLORIDE, CALCIUM CHLORIDE 600; 310; 30; 20 MG/100ML; MG/100ML; MG/100ML; MG/100ML
INJECTION, SOLUTION INTRAVENOUS CONTINUOUS PRN
Status: DISCONTINUED | OUTPATIENT
Start: 2019-08-07 | End: 2019-08-07

## 2019-08-07 RX ADMIN — SUCCINYLCHOLINE CHLORIDE 120 MG: 20 INJECTION, SOLUTION INTRAMUSCULAR; INTRAVENOUS; PARENTERAL at 06:43

## 2019-08-07 RX ADMIN — KETOROLAC TROMETHAMINE 15 MG: 15 INJECTION, SOLUTION INTRAMUSCULAR; INTRAVENOUS at 06:03

## 2019-08-07 RX ADMIN — METHOHEXITAL SODIUM 110 MG: 500 INJECTION, POWDER, LYOPHILIZED, FOR SOLUTION INTRAMUSCULAR; INTRAVENOUS; RECTAL at 06:43

## 2019-08-07 RX ADMIN — SODIUM CHLORIDE, POTASSIUM CHLORIDE, SODIUM LACTATE AND CALCIUM CHLORIDE: 600; 310; 30; 20 INJECTION, SOLUTION INTRAVENOUS at 06:36

## 2019-08-07 RX ADMIN — SODIUM CHLORIDE, POTASSIUM CHLORIDE, SODIUM LACTATE AND CALCIUM CHLORIDE 500 ML: 600; 310; 30; 20 INJECTION, SOLUTION INTRAVENOUS at 06:07

## 2019-08-07 ASSESSMENT — LIFESTYLE VARIABLES: TOBACCO_USE: 0

## 2019-08-07 NOTE — ANESTHESIA CARE TRANSFER NOTE
Patient: Nikky Hancock    * No procedures listed *    Diagnosis: * No pre-op diagnosis entered *  Diagnosis Additional Information: No value filed.    Anesthesia Type:   General     Note:  Airway :Nasal Cannula  Patient transferred to:PACU  Comments: Native airway general anesthetic.  Patient hyperventilated with 100% oxygen via mask prior to treatment.   Anesthesia induced using patent peripheral IV.    Bite block placed between molars to protect teeth and tongue.     After induction of seizure patient mask ventilated with 100% oxygen until spontaneous respirations returned.     At time of handoff to PACU, patient exhibited spontaneous respirations, adequate tidal volumes, airway patent. Oxygen via nasal cannula at 3 liters per minute to PACU in cart with siderails up, connected to wall O2 in PACU. All monitors and alarms on and functioning. Report given to PACU RN and questions answered. Handoff Report: Identifed the Patient, Identified the Reponsible Provider, Reviewed the pertinent medical history, Discussed the surgical course, Reviewed Intra-OP anesthesia mangement and issues during anesthesia, Set expectations for post-procedure period and Allowed opportunity for questions and acknowledgement of understanding      Vitals: (Last set prior to Anesthesia Care Transfer)    CRNA VITALS  8/7/2019 0621 - 8/7/2019 0653      8/7/2019             Pulse:  99    SpO2:  91 %    Resp Rate (set):  10                Electronically Signed By: DARNELL Atkinson CRNA  August 7, 2019  6:53 AM

## 2019-08-07 NOTE — PROCEDURES
[facility] ECT Procedure Note     Nikky Hancock [id number]   54 year old 1965     Patient Status: Outpatient    Allergies   Allergen Reactions     Morphine Anaphylaxis       Weight:  0 lbs 0 oz              Diagnosis:   Major depression       Indications for ECT:   Medications ineffective       Pause for the Cause:     Right patient Yes   Right procedure/laterality settings: Yes   Right diagnosis Yes          Intra-Procedure Documentation:     Date:  8/7/2019  Time:  6:17 AM    ECT #    Treatment number this series: 4   Total treatment number: 4   Type of ECT:  Bilateral, standard    ECT Medications administered: See MAR and Anesthesia record         Clinical Narrative:     ECT was administered by Thymatron machine.  Pt has been medically cleared for procedure, consent signed. Side effects, Risks and benefits reviewed.    ECT Strip Summary:   Energy Level: 90 percent  Motor Seizure Duration: 30 seconds  EEG Seizure Duration: 30 seconds    Complications: No    Plan: 6th ECT 8/9/19  Outpatient Psychiatrist: Dr Cordoba

## 2019-08-07 NOTE — ANESTHESIA POSTPROCEDURE EVALUATION
Patient: Nikky Hancock    * No procedures listed *    Diagnosis:* No pre-op diagnosis entered *  Diagnosis Additional Information: No value filed.    Anesthesia Type:  General    Note:  Anesthesia Post Evaluation    Patient location during evaluation: PACU  Patient participation: Able to fully participate in evaluation  Level of consciousness: awake, awake and alert and responsive to verbal stimuli  Pain management: adequate  Airway patency: patent  Cardiovascular status: acceptable  Respiratory status: acceptable  Hydration status: acceptable  PONV: none     Anesthetic complications: None          Last vitals:  Vitals:    08/07/19 0552 08/07/19 0652 08/07/19 0655   BP: 128/81 (!) 143/96 (!) 144/86   Pulse:   75   Resp: 18 20 10   Temp: 37.2  C (98.9  F)  37.2  C (98.9  F)   SpO2: 98% 98% 97%         Electronically Signed By: Soraida Helm  August 7, 2019  7:03 AM

## 2019-08-07 NOTE — ANESTHESIA PREPROCEDURE EVALUATION
Anesthesia Pre-Procedure Evaluation    Patient: Nikky Hancock   MRN: [id number] : 1965          Preoperative Diagnosis: * No pre-op diagnosis entered *    * No procedures listed *    Past Medical History:   Diagnosis Date     Alcohol abuse, in remission      Alcohol addiction (H)      Anxiety      Asthma      Bipolar 2 disorder (H)      Depressive disorder      GERD (gastroesophageal reflux disease)      TANYA (obstructive sleep apnea)      Protein S deficiency (H)      Pulmonary embolism (H)      Past Surgical History:   Procedure Laterality Date     BYPASS GASTRIC DUODENAL SWITCH       C NONSPECIFIC PROCEDURE      c/s, gastric bypass , nicolas     CHOLECYSTECTOMY       GYN SURGERY         Anesthesia Evaluation     . Pt has had prior anesthetic.     No history of anesthetic complications          ROS/MED HX    ENT/Pulmonary:     (+)sleep apnea, asthma uses CPAP , . .   (-) tobacco use   Neurologic:       Cardiovascular:         METS/Exercise Tolerance:     Hematologic: Comments: Dvt/pe in the poast, protein s deficiency        Musculoskeletal:         GI/Hepatic:     (+) GERD       Renal/Genitourinary:         Endo:         Psychiatric:     (+) psychiatric history (etohy) anxiety and depression      Infectious Disease:         Malignancy:         Other:                            Physical Exam  Normal systems: dental    Airway   Mallampati: I  TM distance: >3 FB  Neck ROM: full    Dental     Cardiovascular   Rhythm and rate: regular and normal      Pulmonary    breath sounds clear to auscultation            Lab Results   Component Value Date    WBC 9.5 2019    HGB 12.9 2019    HCT 38.4 2019     2019     2019    POTASSIUM 3.9 2019    CHLORIDE 107 2019    CO2 29 2019    BUN 6 (L) 2019    CR 0.58 2019    GLC 96 2019    STACIE 8.6 2019    PHOS 3.6 2011    MAG 2.0 2011    ALBUMIN 3.2 (L) 2011    PROTTOTAL 6.1 (L)  "03/29/2011    ALT 8 03/29/2011    AST 28 03/29/2011    GGT 16 09/12/2010    ALKPHOS 46 03/29/2011    BILITOTAL <0.1 (L) 03/29/2011    PTT 32 08/05/2010    INR 1.64 (H) 07/26/2019    TSH 3.60 07/23/2019    HCG Negative 03/29/2011    HCGS Negative 07/23/2019       Preop Vitals  BP Readings from Last 3 Encounters:   08/07/19 128/81   08/05/19 132/65   07/31/19 (!) 149/89    Pulse Readings from Last 3 Encounters:   08/05/19 65   07/31/19 59   07/29/19 59      Resp Readings from Last 3 Encounters:   08/07/19 18   08/05/19 8   07/31/19 17    SpO2 Readings from Last 3 Encounters:   08/07/19 98%   08/05/19 99%   07/31/19 96%      Temp Readings from Last 1 Encounters:   08/07/19 37.2  C (98.9  F) (Temporal)    Ht Readings from Last 1 Encounters:   07/23/19 1.549 m (5' 1\")      Wt Readings from Last 1 Encounters:   07/23/19 107.1 kg (236 lb 1.6 oz)    Estimated body mass index is 44.61 kg/m  as calculated from the following:    Height as of 7/23/19: 1.549 m (5' 1\").    Weight as of 7/23/19: 107.1 kg (236 lb 1.6 oz).       Anesthesia Plan      History & Physical Review  History and physical reviewed and following examination; no interval change.    ASA Status:  2 .        Plan for General with Intravenous induction.   PONV prophylaxis:  Ondansetron (or other 5HT-3)       Postoperative Care      Consents  Anesthetic plan, risks, benefits and alternatives discussed with:  Patient..                   Soraida Helm  "

## 2019-08-09 ENCOUNTER — ANESTHESIA (OUTPATIENT)
Dept: SURGERY | Facility: CLINIC | Age: 54
End: 2019-08-09

## 2019-08-09 ENCOUNTER — HOSPITAL ENCOUNTER (OUTPATIENT)
Dept: SURGERY | Facility: CLINIC | Age: 54
Discharge: HOME | End: 2019-08-09
Attending: PSYCHIATRY & NEUROLOGY | Admitting: PSYCHIATRY & NEUROLOGY
Payer: COMMERCIAL

## 2019-08-09 ENCOUNTER — ANESTHESIA EVENT (OUTPATIENT)
Dept: SURGERY | Facility: CLINIC | Age: 54
End: 2019-08-09

## 2019-08-09 VITALS
TEMPERATURE: 98.8 F | SYSTOLIC BLOOD PRESSURE: 125 MMHG | BODY MASS INDEX: 42.51 KG/M2 | DIASTOLIC BLOOD PRESSURE: 80 MMHG | WEIGHT: 225 LBS | HEART RATE: 71 BPM | RESPIRATION RATE: 17 BRPM | OXYGEN SATURATION: 95 %

## 2019-08-09 DIAGNOSIS — F33.2 SEVERE RECURRENT MAJOR DEPRESSION WITHOUT PSYCHOTIC FEATURES (H): Primary | ICD-10-CM

## 2019-08-09 DIAGNOSIS — F31.81 BIPOLAR 2 DISORDER (H): ICD-10-CM

## 2019-08-09 PROCEDURE — 25000128 H RX IP 250 OP 636: Performed by: PSYCHIATRY & NEUROLOGY

## 2019-08-09 PROCEDURE — 37000008 ZZH ANESTHESIA TECHNICAL FEE, 1ST 30 MIN

## 2019-08-09 PROCEDURE — 25000128 H RX IP 250 OP 636: Performed by: NURSE ANESTHETIST, CERTIFIED REGISTERED

## 2019-08-09 PROCEDURE — 25000125 ZZHC RX 250: Performed by: NURSE ANESTHETIST, CERTIFIED REGISTERED

## 2019-08-09 PROCEDURE — 90870 ELECTROCONVULSIVE THERAPY: CPT

## 2019-08-09 PROCEDURE — 40000010 ZZH STATISTIC ANES STAT CODE-CRNA PER MINUTE

## 2019-08-09 PROCEDURE — 25800030 ZZH RX IP 258 OP 636: Performed by: ANESTHESIOLOGY

## 2019-08-09 RX ORDER — KETOROLAC TROMETHAMINE 15 MG/ML
15 INJECTION, SOLUTION INTRAMUSCULAR; INTRAVENOUS
Status: CANCELLED
Start: 2019-08-09

## 2019-08-09 RX ORDER — SODIUM CHLORIDE, SODIUM LACTATE, POTASSIUM CHLORIDE, CALCIUM CHLORIDE 600; 310; 30; 20 MG/100ML; MG/100ML; MG/100ML; MG/100ML
500 INJECTION, SOLUTION INTRAVENOUS CONTINUOUS
Status: DISCONTINUED | OUTPATIENT
Start: 2019-08-09 | End: 2019-08-10 | Stop reason: HOSPADM

## 2019-08-09 RX ORDER — KETOROLAC TROMETHAMINE 15 MG/ML
15 INJECTION, SOLUTION INTRAMUSCULAR; INTRAVENOUS
Status: DISCONTINUED | OUTPATIENT
Start: 2019-08-09 | End: 2019-08-10 | Stop reason: HOSPADM

## 2019-08-09 RX ADMIN — SUCCINYLCHOLINE CHLORIDE 120 MG: 20 INJECTION, SOLUTION INTRAMUSCULAR; INTRAVENOUS; PARENTERAL at 06:43

## 2019-08-09 RX ADMIN — KETOROLAC TROMETHAMINE 15 MG: 15 INJECTION, SOLUTION INTRAMUSCULAR; INTRAVENOUS at 06:23

## 2019-08-09 RX ADMIN — SODIUM CHLORIDE, POTASSIUM CHLORIDE, SODIUM LACTATE AND CALCIUM CHLORIDE 500 ML: 600; 310; 30; 20 INJECTION, SOLUTION INTRAVENOUS at 06:23

## 2019-08-09 RX ADMIN — METHOHEXITAL SODIUM 10 MG: 500 INJECTION, POWDER, LYOPHILIZED, FOR SOLUTION INTRAMUSCULAR; INTRAVENOUS; RECTAL at 06:43

## 2019-08-09 ASSESSMENT — LIFESTYLE VARIABLES: TOBACCO_USE: 0

## 2019-08-09 NOTE — ANESTHESIA PREPROCEDURE EVALUATION
Anesthesia Pre-Procedure Evaluation    Patient: Nikky Hancock   MRN: [id number] : 1965          Preoperative Diagnosis: * No pre-op diagnosis entered *    * No procedures listed *    Past Medical History:   Diagnosis Date     Alcohol abuse, in remission      Alcohol addiction (H)      Anxiety      Asthma      Bipolar 2 disorder (H)      Depressive disorder      GERD (gastroesophageal reflux disease)      TANYA (obstructive sleep apnea)      Protein S deficiency (H)      Pulmonary embolism (H)      Past Surgical History:   Procedure Laterality Date     BYPASS GASTRIC DUODENAL SWITCH       C NONSPECIFIC PROCEDURE      c/s, gastric bypass , nicolas     CHOLECYSTECTOMY       GYN SURGERY         Anesthesia Evaluation     . Pt has had prior anesthetic.     No history of anesthetic complications          ROS/MED HX    ENT/Pulmonary:     (+)sleep apnea, asthma uses CPAP , . .   (-) tobacco use   Neurologic:       Cardiovascular:         METS/Exercise Tolerance:     Hematologic: Comments: Dvt/pe in the poast, protein s deficiency        Musculoskeletal:         GI/Hepatic:     (+) GERD       Renal/Genitourinary:         Endo:         Psychiatric:     (+) psychiatric history (etohy) anxiety and depression      Infectious Disease:         Malignancy:         Other:                            Physical Exam  Normal systems: dental    Airway   Mallampati: I  TM distance: >3 FB  Neck ROM: full    Dental     Cardiovascular   Rhythm and rate: regular and normal      Pulmonary    breath sounds clear to auscultation            Lab Results   Component Value Date    WBC 9.5 2019    HGB 12.9 2019    HCT 38.4 2019     2019     2019    POTASSIUM 3.9 2019    CHLORIDE 107 2019    CO2 29 2019    BUN 6 (L) 2019    CR 0.58 2019    GLC 96 2019    STACIE 8.6 2019    PHOS 3.6 2011    MAG 2.0 2011    ALBUMIN 3.2 (L) 2011    PROTTOTAL 6.1 (L)  "03/29/2011    ALT 8 03/29/2011    AST 28 03/29/2011    GGT 16 09/12/2010    ALKPHOS 46 03/29/2011    BILITOTAL <0.1 (L) 03/29/2011    PTT 32 08/05/2010    INR 1.64 (H) 07/26/2019    TSH 3.60 07/23/2019    HCG Negative 03/29/2011    HCGS Negative 07/23/2019       Preop Vitals  BP Readings from Last 3 Encounters:   08/09/19 126/84   08/07/19 122/80   08/05/19 132/65    Pulse Readings from Last 3 Encounters:   08/07/19 64   08/05/19 65   07/31/19 59      Resp Readings from Last 3 Encounters:   08/09/19 12   08/07/19 10   08/05/19 8    SpO2 Readings from Last 3 Encounters:   08/09/19 97%   08/07/19 94%   08/05/19 99%      Temp Readings from Last 1 Encounters:   08/09/19 37.1  C (98.8  F)    Ht Readings from Last 1 Encounters:   07/23/19 1.549 m (5' 1\")      Wt Readings from Last 1 Encounters:   08/09/19 102.1 kg (225 lb)    Estimated body mass index is 42.51 kg/m  as calculated from the following:    Height as of 7/23/19: 1.549 m (5' 1\").    Weight as of an earlier encounter on 8/9/19: 102.1 kg (225 lb).       Anesthesia Plan      History & Physical Review  History and physical reviewed and following examination; no interval change.    ASA Status:  2 .        Plan for General (mask) with Intravenous (methohexital) induction.   PONV prophylaxis:  Ondansetron (or other 5HT-3)       Postoperative Care      Consents  Anesthetic plan, risks, benefits and alternatives discussed with:  Patient..                   Hal Terrell,   "

## 2019-08-09 NOTE — ANESTHESIA POSTPROCEDURE EVALUATION
Patient: Nikky Hancock    * No procedures listed *    Diagnosis:* No pre-op diagnosis entered *  Diagnosis Additional Information: No value filed.    Anesthesia Type:  General    Note:  Anesthesia Post Evaluation    Patient location during evaluation: PACU  Patient participation: Able to fully participate in evaluation  Level of consciousness: awake and alert  Pain management: adequate  Airway patency: patent  Cardiovascular status: acceptable and hemodynamically stable  Respiratory status: acceptable and unassisted  Hydration status: acceptable  PONV: none             Last vitals:  Vitals:    08/09/19 0710 08/09/19 0715 08/09/19 0720   BP: 133/84 120/78 125/80   Pulse: 81 70 71   Resp: 13 15 17   Temp:      SpO2: 95% 95% 95%         Electronically Signed By: Hal Terrell DO  August 9, 2019  8:07 AM

## 2019-08-09 NOTE — PROCEDURES
[facility] ECT Procedure Note     Nikky Hancock [id number]   54 year old 1965     Patient Status: Outpatient    Allergies   Allergen Reactions     Morphine Anaphylaxis       Weight:  225 lbs 0 oz              Diagnosis:   Major depression       Indications for ECT:   Medications ineffective       Pause for the Cause:     Right patient Yes   Right procedure/laterality settings: Yes   Right diagnosis Yes          Intra-Procedure Documentation:     Date:  8/9/2019  Time:  6:17 AM    ECT #    Treatment number this series: 5   Total treatment number: 5   Type of ECT:  Bilateral, standard    ECT Medications administered: See MAR and Anesthesia record         Clinical Narrative:     ECT was administered by Thymatron machine.  Pt has been medically cleared for procedure, consent signed. Side effects, Risks and benefits reviewed.    ECT Strip Summary:   Energy Level: 100 percent  Motor Seizure Duration: 30 seconds  EEG Seizure Duration: 30 seconds    Complications: No    Plan: 7th ECT 8/12/19  Outpatient Psychiatrist: Dr Cordoba

## 2019-08-12 ENCOUNTER — ANESTHESIA EVENT (OUTPATIENT)
Dept: SURGERY | Facility: CLINIC | Age: 54
End: 2019-08-12

## 2019-08-12 ENCOUNTER — ANESTHESIA (OUTPATIENT)
Dept: SURGERY | Facility: CLINIC | Age: 54
End: 2019-08-12

## 2019-08-12 ENCOUNTER — HOSPITAL ENCOUNTER (OUTPATIENT)
Dept: SURGERY | Facility: CLINIC | Age: 54
Discharge: HOME | End: 2019-08-12
Attending: PSYCHIATRY & NEUROLOGY | Admitting: PSYCHIATRY & NEUROLOGY
Payer: COMMERCIAL

## 2019-08-12 VITALS
DIASTOLIC BLOOD PRESSURE: 87 MMHG | TEMPERATURE: 98.8 F | RESPIRATION RATE: 15 BRPM | OXYGEN SATURATION: 94 % | HEART RATE: 73 BPM | SYSTOLIC BLOOD PRESSURE: 124 MMHG

## 2019-08-12 DIAGNOSIS — F31.81 BIPOLAR 2 DISORDER (H): ICD-10-CM

## 2019-08-12 DIAGNOSIS — F33.2 SEVERE RECURRENT MAJOR DEPRESSION WITHOUT PSYCHOTIC FEATURES (H): Primary | ICD-10-CM

## 2019-08-12 PROCEDURE — 40000010 ZZH STATISTIC ANES STAT CODE-CRNA PER MINUTE

## 2019-08-12 PROCEDURE — 25000566 ZZH SEVOFLURANE, EA 15 MIN

## 2019-08-12 PROCEDURE — 25000128 H RX IP 250 OP 636: Performed by: NURSE ANESTHETIST, CERTIFIED REGISTERED

## 2019-08-12 PROCEDURE — 25000128 H RX IP 250 OP 636: Performed by: PSYCHIATRY & NEUROLOGY

## 2019-08-12 PROCEDURE — 37000008 ZZH ANESTHESIA TECHNICAL FEE, 1ST 30 MIN

## 2019-08-12 PROCEDURE — 90870 ELECTROCONVULSIVE THERAPY: CPT

## 2019-08-12 PROCEDURE — 25000125 ZZHC RX 250: Performed by: NURSE ANESTHETIST, CERTIFIED REGISTERED

## 2019-08-12 PROCEDURE — 25800030 ZZH RX IP 258 OP 636: Performed by: NURSE ANESTHETIST, CERTIFIED REGISTERED

## 2019-08-12 PROCEDURE — 25800030 ZZH RX IP 258 OP 636: Performed by: ANESTHESIOLOGY

## 2019-08-12 RX ORDER — SODIUM CHLORIDE, SODIUM LACTATE, POTASSIUM CHLORIDE, CALCIUM CHLORIDE 600; 310; 30; 20 MG/100ML; MG/100ML; MG/100ML; MG/100ML
INJECTION, SOLUTION INTRAVENOUS CONTINUOUS PRN
Status: DISCONTINUED | OUTPATIENT
Start: 2019-08-12 | End: 2019-08-12

## 2019-08-12 RX ORDER — KETOROLAC TROMETHAMINE 15 MG/ML
15 INJECTION, SOLUTION INTRAMUSCULAR; INTRAVENOUS
Status: CANCELLED
Start: 2019-08-12

## 2019-08-12 RX ORDER — SODIUM CHLORIDE, SODIUM LACTATE, POTASSIUM CHLORIDE, CALCIUM CHLORIDE 600; 310; 30; 20 MG/100ML; MG/100ML; MG/100ML; MG/100ML
500 INJECTION, SOLUTION INTRAVENOUS CONTINUOUS
Status: DISCONTINUED | OUTPATIENT
Start: 2019-08-12 | End: 2019-08-13 | Stop reason: HOSPADM

## 2019-08-12 RX ORDER — KETOROLAC TROMETHAMINE 15 MG/ML
15 INJECTION, SOLUTION INTRAMUSCULAR; INTRAVENOUS
Status: DISCONTINUED | OUTPATIENT
Start: 2019-08-12 | End: 2019-08-13 | Stop reason: HOSPADM

## 2019-08-12 RX ADMIN — METHOHEXITAL SODIUM 110 MG: 500 INJECTION, POWDER, LYOPHILIZED, FOR SOLUTION INTRAMUSCULAR; INTRAVENOUS; RECTAL at 06:48

## 2019-08-12 RX ADMIN — SODIUM CHLORIDE, POTASSIUM CHLORIDE, SODIUM LACTATE AND CALCIUM CHLORIDE 500 ML: 600; 310; 30; 20 INJECTION, SOLUTION INTRAVENOUS at 06:29

## 2019-08-12 RX ADMIN — SODIUM CHLORIDE, POTASSIUM CHLORIDE, SODIUM LACTATE AND CALCIUM CHLORIDE: 600; 310; 30; 20 INJECTION, SOLUTION INTRAVENOUS at 06:44

## 2019-08-12 RX ADMIN — KETOROLAC TROMETHAMINE 15 MG: 15 INJECTION, SOLUTION INTRAMUSCULAR; INTRAVENOUS at 06:21

## 2019-08-12 RX ADMIN — SUCCINYLCHOLINE CHLORIDE 120 MG: 20 INJECTION, SOLUTION INTRAMUSCULAR; INTRAVENOUS; PARENTERAL at 06:48

## 2019-08-12 ASSESSMENT — LIFESTYLE VARIABLES: TOBACCO_USE: 0

## 2019-08-12 NOTE — ANESTHESIA CARE TRANSFER NOTE
Patient: Nikky Hancock    * No procedures listed *    Diagnosis: * No pre-op diagnosis entered *  Diagnosis Additional Information: No value filed.    Anesthesia Type:   General     Note:  Airway :Nasal Cannula  Patient transferred to:PACU  Comments: Transferred to PACU, spontaneous respirations, 4L oxygen via NC.  All monitors and alarms on and functioning, VSS.  Patient awake, comfortable.  Report to PACU RN.Handoff Report: Identifed the Patient, Identified the Reponsible Provider, Reviewed the pertinent medical history, Discussed the surgical course, Reviewed Intra-OP anesthesia mangement and issues during anesthesia, Set expectations for post-procedure period and Allowed opportunity for questions and acknowledgement of understanding      Vitals: (Last set prior to Anesthesia Care Transfer)    CRNA VITALS  8/12/2019 0628 - 8/12/2019 0658      8/12/2019             Pulse:  85    SpO2:  100 %    Resp Rate (set):  10                Electronically Signed By: DARNELL Johnson CRNA  August 12, 2019  6:58 AM

## 2019-08-12 NOTE — ANESTHESIA PREPROCEDURE EVALUATION
Anesthesia Pre-Procedure Evaluation    Patient: Nikky Hancock   MRN: [id number] : 1965          Preoperative Diagnosis: * No surgery found *        Past Medical History:   Diagnosis Date     Alcohol abuse, in remission      Alcohol addiction (H)      Anxiety      Asthma      Bipolar 2 disorder (H)      Depressive disorder      GERD (gastroesophageal reflux disease)      TANYA (obstructive sleep apnea)      Protein S deficiency (H)      Pulmonary embolism (H)      Past Surgical History:   Procedure Laterality Date     BYPASS GASTRIC DUODENAL SWITCH       C NONSPECIFIC PROCEDURE      c/s, gastric bypass , nicolas     CHOLECYSTECTOMY       GYN SURGERY         Anesthesia Evaluation     . Pt has had prior anesthetic.     No history of anesthetic complications          ROS/MED HX    ENT/Pulmonary:     (+)sleep apnea, Intermittent asthma Treatment: Inhaled steroids and Inhaler prn,  uses CPAP , . .   (-) tobacco use   Neurologic:       Cardiovascular:     (+) ----. Taking blood thinners : . . . :. . Previous cardiac testing date:results:date: results:ECG reviewed date:2019 results:NSR date: results:          METS/Exercise Tolerance:     Hematologic: Comments: Dvt/pe in the past, protein s deficiency    (+) History of blood clots pt is anticoagulated, -      Musculoskeletal:         GI/Hepatic:     (+) GERD       Renal/Genitourinary:         Endo: Comment: BMI 45    (+) Obesity, .      Psychiatric: Comment: History of alcohol abuse    (+) psychiatric history (etohy) anxiety, depression and bipolar      Infectious Disease:         Malignancy:         Other:                          Physical Exam  Normal systems: dental    Airway   Mallampati: III  TM distance: >3 FB  Neck ROM: full    Dental     Cardiovascular   Rhythm and rate: regular      Pulmonary    breath sounds clear to auscultation            Lab Results   Component Value Date    WBC 9.5 2019    HGB 12.9 2019    HCT 38.4 2019      "07/23/2019     07/23/2019    POTASSIUM 3.9 07/23/2019    CHLORIDE 107 07/23/2019    CO2 29 07/23/2019    BUN 6 (L) 07/23/2019    CR 0.58 07/23/2019    GLC 96 07/23/2019    STACIE 8.6 07/23/2019    PHOS 3.6 04/01/2011    MAG 2.0 04/01/2011    ALBUMIN 3.2 (L) 03/29/2011    PROTTOTAL 6.1 (L) 03/29/2011    ALT 8 03/29/2011    AST 28 03/29/2011    GGT 16 09/12/2010    ALKPHOS 46 03/29/2011    BILITOTAL <0.1 (L) 03/29/2011    PTT 32 08/05/2010    INR 1.64 (H) 07/26/2019    TSH 3.60 07/23/2019    HCG Negative 03/29/2011    HCGS Negative 07/23/2019       Preop Vitals  BP Readings from Last 3 Encounters:   08/12/19 116/77   08/09/19 125/80   08/07/19 122/80    Pulse Readings from Last 3 Encounters:   08/09/19 71   08/07/19 64   08/05/19 65      Resp Readings from Last 3 Encounters:   08/12/19 16   08/09/19 17   08/07/19 10    SpO2 Readings from Last 3 Encounters:   08/12/19 99%   08/09/19 95%   08/07/19 94%      Temp Readings from Last 1 Encounters:   08/12/19 36.8  C (98.3  F) (Temporal)    Ht Readings from Last 1 Encounters:   07/23/19 1.549 m (5' 1\")      Wt Readings from Last 1 Encounters:   08/09/19 102.1 kg (225 lb)    Estimated body mass index is 42.51 kg/m  as calculated from the following:    Height as of 7/23/19: 1.549 m (5' 1\").    Weight as of 8/9/19: 102.1 kg (225 lb).       Anesthesia Plan      History & Physical Review  History and physical reviewed and following examination; no interval change.    ASA Status:  3 .    NPO Status:  > 8 hours    Plan for General (Mask ventilation) with Intravenous induction.   PONV prophylaxis:  Ondansetron (or other 5HT-3)       Postoperative Care      Consents  Anesthetic plan, risks, benefits and alternatives discussed with:  Patient..                 Wilbur Medina MD  "

## 2019-08-12 NOTE — ANESTHESIA POSTPROCEDURE EVALUATION
Patient: Nikky Hancock    * No procedures listed *    Diagnosis:* No pre-op diagnosis entered *  Diagnosis Additional Information: No value filed.    Anesthesia Type:  General    Note:  Anesthesia Post Evaluation    Patient location during evaluation: Bedside  Patient participation: Able to fully participate in evaluation  Level of consciousness: awake and alert  Pain management: adequate  Airway patency: patent  Cardiovascular status: acceptable  Respiratory status: acceptable  Hydration status: acceptable  PONV: none             Last vitals:  Vitals:    08/12/19 0720 08/12/19 0725 08/12/19 0730   BP: (!) 135/92 125/77 124/87   Pulse: 84 73    Resp: 15 19 15   Temp: 37.1  C (98.8  F)     SpO2: 95% 93% 94%         Electronically Signed By: Wilbur Medina MD  August 12, 2019  8:04 AM

## 2019-08-12 NOTE — PROCEDURES
[facility] ECT Procedure Note     Nikky Hancock [id number]   54 year old 1965     Patient Status: Outpatient    Allergies   Allergen Reactions     Morphine Anaphylaxis       Weight:  0 lbs 0 oz              Diagnosis:   Major depression       Indications for ECT:   Medications ineffective       Pause for the Cause:     Right patient Yes   Right procedure/laterality settings: Yes   Right diagnosis Yes          Intra-Procedure Documentation:     Date:  8/12/2019  Time:  6:17 AM    ECT #    Treatment number this series: 7   Total treatment number: 7   Type of ECT:  Bilateral, standard    ECT Medications administered: See MAR and Anesthesia record         Clinical Narrative:     ECT was administered by Thymatron machine.  Pt has been medically cleared for procedure, consent signed. Side effects, Risks and benefits reviewed.    ECT Strip Summary:   Energy Level: 100 percent  Motor Seizure Duration: 30 seconds  EEG Seizure Duration: 30 seconds    Complications: No    Plan: 8th ECT 8/14/19  Outpatient Psychiatrist: Dr Cordoba

## 2019-08-14 ENCOUNTER — HOSPITAL ENCOUNTER (OUTPATIENT)
Dept: SURGERY | Facility: CLINIC | Age: 54
Discharge: HOME | End: 2019-08-14
Attending: PSYCHIATRY & NEUROLOGY | Admitting: PSYCHIATRY & NEUROLOGY
Payer: COMMERCIAL

## 2019-08-14 ENCOUNTER — ANESTHESIA (OUTPATIENT)
Dept: SURGERY | Facility: CLINIC | Age: 54
End: 2019-08-14

## 2019-08-14 ENCOUNTER — ANESTHESIA EVENT (OUTPATIENT)
Dept: SURGERY | Facility: CLINIC | Age: 54
End: 2019-08-14

## 2019-08-14 VITALS
OXYGEN SATURATION: 94 % | HEART RATE: 84 BPM | SYSTOLIC BLOOD PRESSURE: 130 MMHG | RESPIRATION RATE: 22 BRPM | DIASTOLIC BLOOD PRESSURE: 81 MMHG | TEMPERATURE: 98.7 F

## 2019-08-14 DIAGNOSIS — F31.81 BIPOLAR 2 DISORDER (H): ICD-10-CM

## 2019-08-14 DIAGNOSIS — F33.2 SEVERE RECURRENT MAJOR DEPRESSION WITHOUT PSYCHOTIC FEATURES (H): Primary | ICD-10-CM

## 2019-08-14 PROCEDURE — 25800030 ZZH RX IP 258 OP 636: Performed by: ANESTHESIOLOGY

## 2019-08-14 PROCEDURE — 25000128 H RX IP 250 OP 636: Performed by: NURSE ANESTHETIST, CERTIFIED REGISTERED

## 2019-08-14 PROCEDURE — 40000010 ZZH STATISTIC ANES STAT CODE-CRNA PER MINUTE

## 2019-08-14 PROCEDURE — 37000008 ZZH ANESTHESIA TECHNICAL FEE, 1ST 30 MIN

## 2019-08-14 PROCEDURE — 25000128 H RX IP 250 OP 636: Performed by: PSYCHIATRY & NEUROLOGY

## 2019-08-14 PROCEDURE — 25000125 ZZHC RX 250: Performed by: NURSE ANESTHETIST, CERTIFIED REGISTERED

## 2019-08-14 PROCEDURE — 90870 ELECTROCONVULSIVE THERAPY: CPT

## 2019-08-14 RX ORDER — KETOROLAC TROMETHAMINE 15 MG/ML
15 INJECTION, SOLUTION INTRAMUSCULAR; INTRAVENOUS
Start: 2019-08-14

## 2019-08-14 RX ORDER — SODIUM CHLORIDE, SODIUM LACTATE, POTASSIUM CHLORIDE, CALCIUM CHLORIDE 600; 310; 30; 20 MG/100ML; MG/100ML; MG/100ML; MG/100ML
500 INJECTION, SOLUTION INTRAVENOUS CONTINUOUS
Status: DISCONTINUED | OUTPATIENT
Start: 2019-08-14 | End: 2019-08-15 | Stop reason: HOSPADM

## 2019-08-14 RX ORDER — KETOROLAC TROMETHAMINE 15 MG/ML
15 INJECTION, SOLUTION INTRAMUSCULAR; INTRAVENOUS
Status: DISCONTINUED | OUTPATIENT
Start: 2019-08-14 | End: 2019-08-15 | Stop reason: HOSPADM

## 2019-08-14 RX ADMIN — METHOHEXITAL SODIUM 110 MG: 500 INJECTION, POWDER, LYOPHILIZED, FOR SOLUTION INTRAMUSCULAR; INTRAVENOUS; RECTAL at 06:35

## 2019-08-14 RX ADMIN — SUCCINYLCHOLINE CHLORIDE 120 MG: 20 INJECTION, SOLUTION INTRAMUSCULAR; INTRAVENOUS; PARENTERAL at 06:35

## 2019-08-14 RX ADMIN — SODIUM CHLORIDE, SODIUM LACTATE, POTASSIUM CHLORIDE, CALCIUM CHLORIDE 500 ML: 600; 310; 30; 20 INJECTION, SOLUTION INTRAVENOUS at 06:39

## 2019-08-14 RX ADMIN — SODIUM CHLORIDE, SODIUM LACTATE, POTASSIUM CHLORIDE, CALCIUM CHLORIDE: 600; 310; 30; 20 INJECTION, SOLUTION INTRAVENOUS at 06:33

## 2019-08-14 RX ADMIN — KETOROLAC TROMETHAMINE 15 MG: 15 INJECTION, SOLUTION INTRAMUSCULAR; INTRAVENOUS at 06:35

## 2019-08-14 ASSESSMENT — LIFESTYLE VARIABLES: TOBACCO_USE: 0

## 2019-08-14 NOTE — ANESTHESIA PREPROCEDURE EVALUATION
Anesthesia Pre-Procedure Evaluation    Patient: Nikky Hancock   MRN: [id number] : 1965          Preoperative Diagnosis: * No pre-op diagnosis entered *    * No procedures listed *    Past Medical History:   Diagnosis Date     Alcohol abuse, in remission      Alcohol addiction (H)      Anxiety      Asthma      Bipolar 2 disorder (H)      Depressive disorder      GERD (gastroesophageal reflux disease)      TANYA (obstructive sleep apnea)      Protein S deficiency (H)      Pulmonary embolism (H)      Past Surgical History:   Procedure Laterality Date     BYPASS GASTRIC DUODENAL SWITCH       C NONSPECIFIC PROCEDURE      c/s, gastric bypass , nicolas     CHOLECYSTECTOMY       GYN SURGERY         Anesthesia Evaluation     . Pt has had prior anesthetic.     No history of anesthetic complications          ROS/MED HX    ENT/Pulmonary:     (+)sleep apnea, Intermittent asthma Treatment: Inhaled steroids and Inhaler prn,  uses CPAP , . .   (-) tobacco use   Neurologic:       Cardiovascular:     (+) ----. Taking blood thinners : . . . :. . Previous cardiac testing date:results:date: results:ECG reviewed date:2019 results:NSR date: results:          METS/Exercise Tolerance:     Hematologic: Comments: Dvt/pe in the past, protein s deficiency    (+) History of blood clots pt is anticoagulated, -      Musculoskeletal:         GI/Hepatic:     (+) GERD       Renal/Genitourinary:         Endo: Comment: BMI 45    (+) Obesity, .      Psychiatric: Comment: History of alcohol abuse    (+) psychiatric history (etohy) anxiety, depression and bipolar      Infectious Disease:         Malignancy:         Other:                          Physical Exam  Normal systems: dental    Airway   Mallampati: III  TM distance: >3 FB  Neck ROM: full    Dental     Cardiovascular   Rhythm and rate: regular and normal      Pulmonary    breath sounds clear to auscultation            Lab Results   Component Value Date    WBC 9.5 2019    HGB 12.9  "07/23/2019    HCT 38.4 07/23/2019     07/23/2019     07/23/2019    POTASSIUM 3.9 07/23/2019    CHLORIDE 107 07/23/2019    CO2 29 07/23/2019    BUN 6 (L) 07/23/2019    CR 0.58 07/23/2019    GLC 96 07/23/2019    STACIE 8.6 07/23/2019    PHOS 3.6 04/01/2011    MAG 2.0 04/01/2011    ALBUMIN 3.2 (L) 03/29/2011    PROTTOTAL 6.1 (L) 03/29/2011    ALT 8 03/29/2011    AST 28 03/29/2011    GGT 16 09/12/2010    ALKPHOS 46 03/29/2011    BILITOTAL <0.1 (L) 03/29/2011    PTT 32 08/05/2010    INR 1.64 (H) 07/26/2019    TSH 3.60 07/23/2019    HCG Negative 03/29/2011    HCGS Negative 07/23/2019       Preop Vitals  BP Readings from Last 3 Encounters:   08/14/19 130/86   08/12/19 124/87   08/09/19 125/80    Pulse Readings from Last 3 Encounters:   08/12/19 73   08/09/19 71   08/07/19 64      Resp Readings from Last 3 Encounters:   08/12/19 15   08/09/19 17   08/07/19 10    SpO2 Readings from Last 3 Encounters:   08/14/19 97%   08/12/19 94%   08/09/19 95%      Temp Readings from Last 1 Encounters:   08/14/19 37.1  C (98.7  F) (Temporal)    Ht Readings from Last 1 Encounters:   07/23/19 1.549 m (5' 1\")      Wt Readings from Last 1 Encounters:   08/09/19 102.1 kg (225 lb)    Estimated body mass index is 42.51 kg/m  as calculated from the following:    Height as of 7/23/19: 1.549 m (5' 1\").    Weight as of 8/9/19: 102.1 kg (225 lb).       Anesthesia Plan      History & Physical Review  History and physical reviewed and following examination; no interval change.    ASA Status:  3 .    NPO Status:  > 8 hours    Plan for General with Other induction.          Postoperative Care      Consents  Anesthetic plan, risks, benefits and alternatives discussed with:  Patient..                 Margarito Louis MD  "

## 2019-08-14 NOTE — ANESTHESIA POSTPROCEDURE EVALUATION
Patient: Nikky Hancock    * No procedures listed *    Diagnosis:* No pre-op diagnosis entered *  Diagnosis Additional Information: No value filed.    Anesthesia Type:  General    Note:  Anesthesia Post Evaluation    Patient location during evaluation: PACU  Patient participation: Able to fully participate in evaluation  Level of consciousness: awake  Pain management: adequate  Airway patency: patent  Cardiovascular status: acceptable  Respiratory status: acceptable  Hydration status: acceptable  PONV: none     Anesthetic complications: None          Last vitals:  Vitals:    08/14/19 0705 08/14/19 0710 08/14/19 0715   BP: 137/85 (!) 133/91 130/81   Pulse: 69 67 84   Resp: 29 17 22   Temp:      SpO2: 99% 96% 94%         Electronically Signed By: Margarito Louis MD  August 14, 2019  2:24 PM

## 2019-08-14 NOTE — ANESTHESIA POSTPROCEDURE EVALUATION
Patient: Nikky Hancock    * No procedures listed *    Diagnosis:* No pre-op diagnosis entered *  Diagnosis Additional Information: No value filed.    Anesthesia Type:  General    Note:  Anesthesia Post Evaluation    Patient location during evaluation: PACU  Patient participation: Able to fully participate in evaluation  Level of consciousness: awake  Pain management: adequate  Airway patency: patent  Cardiovascular status: acceptable  Respiratory status: acceptable  Hydration status: acceptable  PONV: none     Anesthetic complications: None          Last vitals:  Vitals:    08/14/19 0655 08/14/19 0700 08/14/19 0705   BP: (!) 147/102 133/87 137/85   Pulse: 69 68 69   Resp: 12 15 29   Temp:      SpO2: 98% 95% 99%         Electronically Signed By: Margarito Louis MD  August 14, 2019  7:09 AM

## 2019-08-14 NOTE — PROCEDURES
[facility] ECT Procedure Note     Nikky Hancock [id number]   54 year old 1965     Patient Status: Outpatient    Allergies   Allergen Reactions     Morphine Anaphylaxis       Weight:  0 lbs 0 oz              Diagnosis:   Major depression       Indications for ECT:   Medications ineffective       Pause for the Cause:     Right patient Yes   Right procedure/laterality settings: Yes   Right diagnosis Yes          Intra-Procedure Documentation:     Date:  8/14/2019  Time:  6:16 AM    ECT #    Treatment number this series: 8   Total treatment number: 8   Type of ECT:  Bilateral, standard    ECT Medications administered: See MAR and Anesthesia record         Clinical Narrative:     ECT was administered by Thymatron machine.  Pt has been medically cleared for procedure, consent signed. Side effects, Risks and benefits reviewed.    ECT Strip Summary:   Energy Level: 100 percent  Motor Seizure Duration: 30 seconds  EEG Seizure Duration: 30 seconds    Complications: No    Plan: done  Outpatient Psychiatrist: Dr Cordoba

## 2021-06-02 VITALS — WEIGHT: 239 LBS | BODY MASS INDEX: 45.16 KG/M2

## 2021-06-21 NOTE — PROGRESS NOTES
[facility]      Code Status:  FULL CODE  Visit Type: H & P     Facility:  [facility] [[id number]]           History of Present Illness: Nikky Hancock is a 53 y.o. female who was admitted to the TCU.  She has underlying history of significant anxiety depression with possible bipolar disorder.  She also has chronic lymphedema and anticoagulation with history of PE.  She was admitted to the hospital with sepsis and lithium toxicity.  Prior to that she had been admitted with multiple falls gait instability and rib fractures.  She was admitted and started on broad-spectrum antibiotics to cover both aspiration pneumonia and UTI.  Admission labs did show a lithium level of 1.4.  Blood cultures were negative and antibiotics were subsequently discontinued  Psychiatric felt all her signs symptoms are consistent with lithium toxicity and this has been discontinued upon her discharge.  Serum Lamictal level was stable and within normal limits it was felt that she was cognitively improved steadily as per psychiatric  She was treated conservatively with her rib fractures with chest pain management.  She is not very happy very upset and crying because she feels her pain meds have not been optimized and is requesting more narcotics.  While in the hospital she had significant drop of hemoglobin also to 9.1 felt to be secondary to chronic anticoagulation, rib fractures  Patient lives in her home with her  who works it was felt that she was unstable and they recommended TCU upon discharge.  She is otherwise independent with her ADLs to mood remains labile she has an appointment with psychiatry tomorrow but wants to cancel it as she feels she is not ready to see them as yet    Past Medical History:   Diagnosis Date     Acute blood loss anemia      Adenomatous colon polyp      Anxiety      Asthma      GERD (gastroesophageal reflux disease)      History of pulmonary embolus (PE)       Hyponatremia      Hyponatremia      Leukocytosis      Major depressive disorder      Morbid obesity (H)      Multiple rib fractures involving four or more ribs      Severe sepsis (H)      Toxic metabolic encephalopathy      Past Surgical History:   Procedure Laterality Date      SECTION       CHOLECYSTECTOMY       GASTRIC BYPASS       Family History   Problem Relation Age of Onset     Depression Mother      Asthma Mother      Diabetes Mother      Hyperlipidemia Father      Alcoholism Brother      Anxiety disorder Brother      Depression Brother      Depression Maternal Grandmother      Depression Paternal Grandmother      Colon cancer Paternal Grandfather      Alcoholism Paternal Grandfather      Hypertension Paternal Grandfather      COPD Maternal Uncle      Social History     Socioeconomic History     Marital status:      Spouse name: Not on file     Number of children: Not on file     Years of education: Not on file     Highest education level: Not on file   Social Needs     Financial resource strain: Not on file     Food insecurity - worry: Not on file     Food insecurity - inability: Not on file     Transportation needs - medical: Not on file     Transportation needs - non-medical: Not on file   Occupational History     Not on file   Tobacco Use     Smoking status: Never Smoker     Smokeless tobacco: Never Used   Substance and Sexual Activity     Alcohol use: No     Drug use: Not on file     Sexual activity: Not on file   Other Topics Concern     Not on file   Social History Narrative     Not on file     Current Outpatient Medications   Medication Sig Dispense Refill     albuterol (PROVENTIL) 2.5 mg /3 mL (0.083 %) nebulizer solution Take 2.5 mg by nebulization every 4 (four) hours as needed for wheezing.       cyanocobalamin 1,000 mcg/mL injection Inject 1,000 mcg into the shoulder, thigh, or buttocks every 30 (thirty) days.       DULoxetine (CYMBALTA) 60 MG capsule Take 60 mg by mouth daily.        ergocalciferol (ERGOCALCIFEROL) 50,000 unit capsule Take 50,000 Units by mouth every 7 days.       furosemide (LASIX) 20 MG tablet Take 20 mg by mouth daily.       gabapentin (NEURONTIN) 600 MG tablet Take 600 mg by mouth 3 (three) times a day.       lamoTRIgine (LAMICTAL) 100 MG tablet Take 300 mg by mouth every morning. And 100 mg in the afternoon       lidocaine (LIDODERM) 5 % Place 1 patch on the skin daily. Remove & Discard patch within 12 hours or as directed by MD       omeprazole (PRILOSEC) 20 MG capsule Take 20 mg by mouth daily before breakfast.       oxyCODONE-acetaminophen (PERCOCET/ENDOCET) 5-325 mg per tablet Take 1 tablet by mouth every 6 (six) hours as needed for pain.       polyethylene glycol (MIRALAX) 17 gram packet Take 17 g by mouth daily.       QUEtiapine (SEROQUEL) 300 MG tablet Take 450 mg by mouth at bedtime.       No current facility-administered medications for this visit.      Allergies   Allergen Reactions     Morphine Shortness Of Breath     Other reaction(s): Diaphoresis, Stomach Upset     Hydromorphone Itching         Review of Systems:    Constitutional: Negative.  Negative for fever, chills, has activity change, appetite change and fatigue.   HENT: Negative for congestion and facial swelling.    Eyes: Negative for photophobia, redness and visual disturbance.   Respiratory: Negative for cough and chest tightness.  Putting chest wall pain  Cardiovascular: Negative for chest pain, palpitations and has leg swelling.   Gastrointestinal: Negative for nausea, diarrhea, constipation, blood in stool and abdominal distention.   Genitourinary: Negative.    Musculoskeletal: Negative.  Reporting pain in her chest wall and wanting better pain medication  Skin: Negative.    Neurological: Negative for dizziness, tremors, syncope, weakness, light-headedness and headaches.   Hematological: Does not bruise/bleed easily.   Psychiatric/Behavioral: Negative.  Anxious tearful in affect  Blood pressure  113/64 pulse 80 temp 98    Physical Exam:    GENERAL: no acute distress. Cooperative in conversation.  Obese  HEENT: pupils are equal, round and reactive. Oral mucosa is moist and intact.  RESP:Chest symmetric. Regular respiratory rate. No stridor.  Tender on the left side of the chest wall over the ribs  CVS: S1S2  ABD: Nondistended, soft.  EXTREMITIES: 2+ lower extremity edema.   NEURO: non focal. Alert and oriented x3.   PSYCH: within normal limits. No depression she has anxiety .  Tearful in affect and crying during exam  SKIN: warm dry intact     Labs:    Recent Labs   11/08/18  0713 11/07/18  0754 11/05/18  1123 11/04/18  0720   WBC -- 11.5 H -- 11.1 H -- 15.2 H   HGB -- -- -- 9.1 L -- 9.4 L   MCV -- -- -- 94 -- 96   PLT -- -- -- 473 H -- 528 H   INR 3.3 H 3.2 H < > -- < > 3.0 H   < > = values in this interval not displayed.     Recent Labs   11/07/18  1213 11/04/18  0720 11/03/18  1331   SODIUM 137 136 --   POTASSIUM 3.3 L 3.6 --   CHLORIDE 100 102 --   YH9PJDFL 30 27 --   BUN 11 6 L --   CREATININE 0.63 0.60 --   GLUCOSE 83 95 --   CALCIUM 9.0 8.7 --   ALBUMIN -- -- 3.6   BILITOTAL -- -- 0.8   BILIDIRECT -- -- 0.3   ALT -- -- 17   AST -- -- 27   PROTEIN -- -- 6.5     Pertinent studies:  Imaging: I reviewed pertinent imaging results.  CT Head/Brain (11/3/18):  No acute intracranial abnormality.  XR Chest (11/3/18):  There is platelike atelectasis in the left midlung the right lung is clear. There is no pneumothorax or pleural effusion. Heart size is normal. There is a probable retrocardiac hiatal hernia. No pneumothorax or pleural effusion.  There has been no significant change from the prior study.   XR Chest (11/2/18):  Heart size and pulmonary vascularity normal. Linear atelectasis mid left lung. Lungs otherwise clear. Trachea is deviated to the right, possibly related to positioning.  CT Chest/Abd/Pelvis (11/1/18):  1.  Left eighth through 11th rib fractures.  2.  Small left pleural effusion and left  basilar atelectasis. No pneumothorax.  3.  No evidence for traumatic injury within the abdomen or pelvis.  4.  Gastric bypass.     Cardiac Diagnostics: I reviewed pertinent cardiac diagnostics.  EKG (11/2/18):  Normal sinus rhythm    Assessment/Plan:    Left 8 to 11th rib fractures  Conservative treatment she did have a small left pleural effusion advised incentive spirometry also  deep breath  History of recurrent falls recently which resulted in the rib fractures felt to be most likely due to lithium toxicity  Recent admission in the hospital with sepsis and leukocytosis with infectious workup being entirely negative.  Psychiatry felt this was secondary to lithium toxicity and this was discontinued  Acute metabolic encephalopathy seems to be resolving slowly after lithium has been discontinued her Lamictal level was stable and that was continued  Serum lithium level was 1.4 which is high normal was felt that she experience some toxicity in the setting of volume depletion and acute renal insufficiency and recent NSAID use  Pain management she has been discharged on topical Lidoderm patch in addition she has Percocet as needed every 6 hours and she feels that it is inadequate for pain relief and is requesting more she has underlying history of some chronic pain and takes gabapentin but now believes this more for anxiety  Discontinue her current Percocet regimen.  Add Percocet 1 tab every 4 hours as needed monitor response  Acute on chronic lymphedema with significant 2-3+ pitting edema noted bilateral lower extremities her diuretic dosage was cut down and her edema is worse we will order some stockings and increase her diuresis and monitor response advised to cut down on fluids to  Discontinue Lasix 20 mg.  Increase Lasix to 40 mg in the morning and 20 mg at noon and monitor response  Bipolar depression continue with her high dose of Seroquel as well as Lamictal and gabapentin along with duloxetine her lithium has  been discontinued she has an appointment to see psychiatry tomorrow but wants to cancel it she was advised to keep that appointment  Vitamin D deficiency she is on high doses of supplementation at 50,000 units every week.  B12 deficiency she takes vitamin B12 injections every 4 weeks  Acute on chronic anemia discharge hemoglobin was 9.1 felt to be  related to chronic anti-Coblation and rib fractures.  Morbid obesity  Leukocytosis now believed to be noninfectious in etiology most likely a stress reaction to her fractures and pain along with lithium toxicity  History of chronic anticoagulation INR today is 1.7   Dosage was adjusted  Debilitation and falls it was felt she is not safe to go back to her home environment and her current situation and she is been discharged to the TCU for strengthening and rehab she actually appears to be quite independent with most of her ADLs  Her clonazepam was also discontinued upon discharge and wondering that is what is contributing to her excessive tearfulness.  Monitor response to change in diuretic dosage as well as leg swelling closely  Care plan was also reviewed with staff and I am encouraging them to have her see psychiatry contact her  if possible  We will have social work involved also because of concern about her discharge plan being unsafe continue to monitor closely    Electronically signed by: PRO Hargrove  This progress note was completed using Dragon software and there may be grammatical errors.

## 2021-06-21 NOTE — PROGRESS NOTES
[facility]      Code Status:  FULL CODE  Visit Type: Discharge Summary     Facility:  [facility] [[id number]]           History of Present Illness: Nikky Hancock is a 53 y.o. female who was admitted to the TCU and is a recent transfer from [facility] with an admission on 11/3/18 and discharge on 11/8/18.  She has underlying history of significant anxiety, depression with possible bipolar disorder.  She also has chronic lymphedema and anticoagulation with history of PE.  She was admitted to the hospital with sepsis and lithium toxicity. Prior to that she had been admitted with multiple falls gait instability and rib fractures. She was admitted and started on broad-spectrum antibiotics to cover both aspiration pneumonia and UTI.  Admission labs did show a lithium level of 1.4.  Blood cultures were negative and antibiotics were subsequently discontinued.  Psychiatric felt all her signs symptoms are consistent with lithium toxicity and this has been discontinued upon her discharge.  Serum Lamictal level was stable and within normal limits it was felt that she was cognitively improved steadily as per psychiatric care. She was treated conservatively with her rib fractures with chest pain management.      She was seen today per discharge planning. She was ambulating without her walker  She has been using her pain pills around the clock but feels she is not ready to taper off them as yet and overall feels better she was given an aqua K pad which she is been using and overall feels pleased that her pain is better controlled. She is noticing significant exacerbation and anxiety. Apparently in the past she has not been very compliant and has not had any levels done to manage lithium levels. Long-term side effects of lithium were explained unfortunately patient feels this is only medication that has controlled her anxiety. Discharge planning was reviewed with her and she  is hoping to go home this weekend. She will be discharged with services. Will change oxycodone dosing per adequate having functionality.  Sloane is     Past Medical History:   Diagnosis Date     Acute blood loss anemia      Adenomatous colon polyp      Anxiety      Asthma      GERD (gastroesophageal reflux disease)      History of pulmonary embolus (PE)      Hyponatremia      Hyponatremia      Leukocytosis      Major depressive disorder      Morbid obesity (H)      Multiple rib fractures involving four or more ribs      Severe sepsis (H)      Toxic metabolic encephalopathy      Past Surgical History:   Procedure Laterality Date      SECTION       CHOLECYSTECTOMY       GASTRIC BYPASS       Family History   Problem Relation Age of Onset     Depression Mother      Asthma Mother      Diabetes Mother      Hyperlipidemia Father      Alcoholism Brother      Anxiety disorder Brother      Depression Brother      Depression Maternal Grandmother      Depression Paternal Grandmother      Colon cancer Paternal Grandfather      Alcoholism Paternal Grandfather      Hypertension Paternal Grandfather      COPD Maternal Uncle      Social History     Socioeconomic History     Marital status:      Spouse name: Not on file     Number of children: Not on file     Years of education: Not on file     Highest education level: Not on file   Social Needs     Financial resource strain: Not on file     Food insecurity - worry: Not on file     Food insecurity - inability: Not on file     Transportation needs - medical: Not on file     Transportation needs - non-medical: Not on file   Occupational History     Not on file   Tobacco Use     Smoking status: Never Smoker     Smokeless tobacco: Never Used   Substance and Sexual Activity     Alcohol use: No     Drug use: Not on file     Sexual activity: Not on file   Other Topics Concern     Not on file   Social History Narrative     Not on file     Current Outpatient Medications    Medication Sig Dispense Refill     albuterol (PROVENTIL) 2.5 mg /3 mL (0.083 %) nebulizer solution Take 2.5 mg by nebulization every 4 (four) hours as needed for wheezing.       cyanocobalamin 1,000 mcg/mL injection Inject 1,000 mcg into the shoulder, thigh, or buttocks every 30 (thirty) days.       DULoxetine (CYMBALTA) 60 MG capsule Take 60 mg by mouth daily.       ergocalciferol (ERGOCALCIFEROL) 50,000 unit capsule Take 50,000 Units by mouth every 7 days.       furosemide (LASIX) 20 MG tablet Take 20 mg by mouth daily.       gabapentin (NEURONTIN) 600 MG tablet Take 600 mg by mouth 3 (three) times a day.       lamoTRIgine (LAMICTAL) 100 MG tablet Take 300 mg by mouth every morning. And 100 mg in the afternoon       lidocaine (LIDODERM) 5 % Place 1 patch on the skin daily. Remove & Discard patch within 12 hours or as directed by MD       omeprazole (PRILOSEC) 20 MG capsule Take 20 mg by mouth daily before breakfast.       oxyCODONE-acetaminophen (PERCOCET/ENDOCET) 5-325 mg per tablet Take 1 tablet by mouth every 8 (eight) hours as needed for pain. 20 tablet 0     polyethylene glycol (MIRALAX) 17 gram packet Take 17 g by mouth daily.       QUEtiapine (SEROQUEL) 300 MG tablet Take 450 mg by mouth at bedtime.       No current facility-administered medications for this visit.      Allergies   Allergen Reactions     Morphine Shortness Of Breath     Other reaction(s): Diaphoresis, Stomach Upset     Hydromorphone Itching         Review of Systems:    Constitutional: Negative.  Negative for fever, chills, has activity change, appetite change and fatigue.   HENT: Negative for congestion and facial swelling.    Eyes: Negative for photophobia, redness and visual disturbance.   Respiratory: Negative for cough and chest tightness.  Putting chest wall pain  Cardiovascular: Negative for chest pain, palpitations and has leg swelling.   Gastrointestinal: Negative for nausea, diarrhea, constipation, blood in stool and abdominal  distention.   Genitourinary: Negative.    Musculoskeletal: Negative.  Reporting pain in her chest wall but states that is controlled with POC.  Skin: Negative.    Neurological: Negative for dizziness, tremors, syncope, weakness, light-headedness and headaches.   Hematological: Does not bruise/bleed easily.   Psychiatric/Behavioral: Negative.  Ambulating without a walker reporting racing thoughts.    Vitals:    11/15/18 1110   BP: 123/81   Pulse: 91   Resp: 18   Temp: 98  F (36.7  C)   SpO2: 93%     Physical Exam:    GENERAL: no acute distress. Cooperative in conversation.  Obese.   HEENT: pupils are equal, round and reactive. Oral mucosa is moist and intact.  RESP:Chest symmetric. Regular respiratory rate. No stridor.  CVS: RRR, S1S2, distant HTs.  ABD: Nondistended, soft. Denies constipation or diarrhea.   EXTREMITIES: 2+ lower extremity edema.   NEURO: non focal. Alert and oriented x3.   PSYCH: within normal limits. No depression she has anxiety. Uses lithium chronically.   SKIN: warm dry intact     Labs:    Recent Labs   11/08/18  0713 11/07/18  0754 11/05/18  1123 11/04/18  0720   WBC -- 11.5 H -- 11.1 H -- 15.2 H   HGB -- -- -- 9.1 L -- 9.4 L   MCV -- -- -- 94 -- 96   PLT -- -- -- 473 H -- 528 H   INR 3.3 H 3.2 H < > -- < > 3.0 H   < > = values in this interval not displayed.     Recent Labs   11/07/18  1213 11/04/18  0720 11/03/18  1331   SODIUM 137 136 --   POTASSIUM 3.3 L 3.6 --   CHLORIDE 100 102 --   [id number] 30 27 --   BUN 11 6 L --   CREATININE 0.63 0.60 --   GLUCOSE 83 95 --   CALCIUM 9.0 8.7 --   ALBUMIN -- -- 3.6   BILITOTAL -- -- 0.8   BILIDIRECT -- -- 0.3   ALT -- -- 17   AST -- -- 27   PROTEIN -- -- 6.5     Pertinent studies:    CT Head/Brain (11/3/18):  No acute intracranial abnormality.  XR Chest (11/3/18):  There is platelike atelectasis in the left midlung the right lung is clear. There is no pneumothorax or pleural effusion. Heart size is normal. There is a probable retrocardiac hiatal hernia.  No pneumothorax or pleural effusion.  There has been no significant change from the prior study.   XR Chest (11/2/18):  Heart size and pulmonary vascularity normal. Linear atelectasis mid left lung. Lungs otherwise clear. Trachea is deviated to the right, possibly related to positioning.  CT Chest/Abd/Pelvis (11/1/18):  1.  Left eighth through 11th rib fractures.  2.  Small left pleural effusion and left basilar atelectasis. No pneumothorax.  3.  No evidence for traumatic injury within the abdomen or pelvis.  4.  Gastric bypass.     Cardiac Diagnostics:  EKG (11/2/18):  Normal sinus rhythm    No results found for: [id number]      Assessment/Plan:    Left 8 to 11th rib fractures: received conservative treatment, she did have a small left pleural effusion advised incentive spirometry also deep breathing treatment.    History of recurrent falls: recently which resulted in the rib fractures felt to be most likely due to lithium toxicity.    Recent admission in the hospital with sepsis and leukocytosis: with infectious workup being entirely negative.  Psychiatry felt this was secondary to lithium toxicity and this was discontinued.    Acute metabolic encephalopathy:  seems to be resolving slowly after lithium has been discontinued her Lamictal level was stable and that was continued.    Serum lithium level was 1.4: which is high normal was felt that she experienced some toxicity in the setting of volume depletion and acute renal insufficiency and recent NSAID use.    Pain management: she has been discharged on topical Lidoderm patch in addition she has Percocet as needed every 6 hours and she feels that it is inadequate for pain relief and is requesting more. She has underlying history of some chronic pain and takes gabapentin but now believes this more for anxiety. Currently on a higher dose of Percocet which seems to be effective in her pain she wants to continue it for now and not discontinue it or taper down as yet  as she feels she is not ready for another week.  I will be calling in script to her pharmacy with new dosing regiment and adding additional tylenol 650mg four times a day. She can f/u with PCP if additional opiates needed.     Chronic lymphedema: significant 2-3+ pitting edema, probably d/t lithium use in conjunction with renal dx. Some improvement noted on lasix.     Bipolar depression: continue with her high dose of Seroquel as well as Lamictal and gabapentin along with duloxetine, her lithium has been discontinued she has an appointment to see psychiatry, which unfortunately she cancelled. States having worsening anxiety with patient requesting reinstatement of discontinued medications including clonazepam and lithium.    Vitamin D deficiency: she is on high doses of supplementation at 50,000 units every week.    B12 deficiency: she takes vitamin B12 injections every 4 weeks    Acute on chronic anemia: discharge hemoglobin was 9.1 felt to be related to chronic anticoagulation and rib fractures.    Morbid obesity: lends to anxiety and depression, f/u with PCP.    Leukocytosis; now believed to be noninfectious in etiology most likely a stress reaction to her fractures and pain along with lithium toxicity, abx d/c'd.    History of chronic anticoagulation: INR today is 1.7, increased coumadin to 8.5mg daily, recheck on 11/19.    Debilitation:  With hx of falls it was felt she is not safe to go back to her home environment and her current situation and she is been discharged to the TCU for strengthening and rehab, she actually appears to be quite independent with most of her ADLs    She stated that she has tried various other medications including Seroquel, she is taking this for insomnia concerns but low-dose Seroquel for anxiety does not work for her gabapentin neither, BuSpar, Vistaril and multiple other medications that she is tried including psychotropic medications have been ineffective. In light of a fall risk  she cannot go back on a diazepam unless okayed by her psychiatrist.  In light of multiple side effects of lithium and the fact that she was feeling toxicity when a high normal levels puts her at risk of going back on the medication.  She remains adamant because she feels this is only medication which has controlled her racing thoughts. After this detailed discussion she was told that we would like her psychiatrist input if we have to restart we will do it at half the dose. She will also contact her primary psychiatric she can see psychology to see if they can help her with her mood and behaviors.      The care plan has been reviewed and all orders signed. Changes to care plan, if any, as noted. Otherwise, continue care plan of care.  The total time spent with this patient was greater than 40 minutes, with greater than 50% spent in counseling and coordination of care that included multiple issues per discharge.      Electronically signed by: King Angel NP

## 2021-06-21 NOTE — PROGRESS NOTES
[facility]      Code Status:  FULL CODE  Visit Type: Review Of Multiple Medical Conditions     Facility:  [facility] [[id number]]           History of Present Illness: Nikky Hancock is a 53 y.o. female who was admitted to the TCU.  She has underlying history of significant anxiety depression with possible bipolar disorder.  She also has chronic lymphedema and anticoagulation with history of PE.  She was admitted to the hospital with sepsis and lithium toxicity.  Prior to that she had been admitted with multiple falls gait instability and rib fractures.  She was admitted and started on broad-spectrum antibiotics to cover both aspiration pneumonia and UTI.  Admission labs did show a lithium level of 1.4.  Blood cultures were negative and antibiotics were subsequently discontinued  Psychiatric felt all her signs symptoms are consistent with lithium toxicity and this has been discontinued upon her discharge.  Serum Lamictal level was stable and within normal limits it was felt that she was cognitively improved steadily as per psychiatric  She was treated conservatively with her rib fractures with chest pain management.    She was seen today at her request.  She was ambulating without her walker  She has been using her pain pills around the clock but feels she is not ready to taper off them as yet and overall feels better she was given an aqua K pad which she is been using and overall feels pleased that her pain is better controlled  She is noticing significant exacerbation and anxiety.  She told me she wanted to restart her lithium on questioning she has tried multiple agents including gabapentin low-dose Seroquel;; BuSpar; along with Vistaril and other agents for anxiety nothing has worked as well  I did talk to her about lithium and she was scheduled to see her psychiatrist which she canceled she told me in the past she has not been very compliant and has not had any  levels done  Long-term side effects of lithium were explained unfortunately patient feels this is only medication that has controlled her anxiety  Discharge planning was reviewed with her and she is hoping to go home this weekend as she feels she is back to normal  She is also requesting narcotics to go home with her due to pain issues  Slums is     Past Medical History:   Diagnosis Date     Acute blood loss anemia      Adenomatous colon polyp      Anxiety      Asthma      GERD (gastroesophageal reflux disease)      History of pulmonary embolus (PE)      Hyponatremia      Hyponatremia      Leukocytosis      Major depressive disorder      Morbid obesity (H)      Multiple rib fractures involving four or more ribs      Severe sepsis (H)      Toxic metabolic encephalopathy      Past Surgical History:   Procedure Laterality Date      SECTION       CHOLECYSTECTOMY       GASTRIC BYPASS       Family History   Problem Relation Age of Onset     Depression Mother      Asthma Mother      Diabetes Mother      Hyperlipidemia Father      Alcoholism Brother      Anxiety disorder Brother      Depression Brother      Depression Maternal Grandmother      Depression Paternal Grandmother      Colon cancer Paternal Grandfather      Alcoholism Paternal Grandfather      Hypertension Paternal Grandfather      COPD Maternal Uncle      Social History     Socioeconomic History     Marital status:      Spouse name: Not on file     Number of children: Not on file     Years of education: Not on file     Highest education level: Not on file   Social Needs     Financial resource strain: Not on file     Food insecurity - worry: Not on file     Food insecurity - inability: Not on file     Transportation needs - medical: Not on file     Transportation needs - non-medical: Not on file   Occupational History     Not on file   Tobacco Use     Smoking status: Never Smoker     Smokeless tobacco: Never Used   Substance and Sexual  Activity     Alcohol use: No     Drug use: Not on file     Sexual activity: Not on file   Other Topics Concern     Not on file   Social History Narrative     Not on file     Current Outpatient Medications   Medication Sig Dispense Refill     albuterol (PROVENTIL) 2.5 mg /3 mL (0.083 %) nebulizer solution Take 2.5 mg by nebulization every 4 (four) hours as needed for wheezing.       cyanocobalamin 1,000 mcg/mL injection Inject 1,000 mcg into the shoulder, thigh, or buttocks every 30 (thirty) days.       DULoxetine (CYMBALTA) 60 MG capsule Take 60 mg by mouth daily.       ergocalciferol (ERGOCALCIFEROL) 50,000 unit capsule Take 50,000 Units by mouth every 7 days.       furosemide (LASIX) 20 MG tablet Take 20 mg by mouth daily.       gabapentin (NEURONTIN) 600 MG tablet Take 600 mg by mouth 3 (three) times a day.       lamoTRIgine (LAMICTAL) 100 MG tablet Take 300 mg by mouth every morning. And 100 mg in the afternoon       lidocaine (LIDODERM) 5 % Place 1 patch on the skin daily. Remove & Discard patch within 12 hours or as directed by MD       omeprazole (PRILOSEC) 20 MG capsule Take 20 mg by mouth daily before breakfast.       oxyCODONE-acetaminophen (PERCOCET/ENDOCET) 5-325 mg per tablet Take 1 tablet by mouth every 6 (six) hours as needed for pain.       polyethylene glycol (MIRALAX) 17 gram packet Take 17 g by mouth daily.       QUEtiapine (SEROQUEL) 300 MG tablet Take 450 mg by mouth at bedtime.       No current facility-administered medications for this visit.      Allergies   Allergen Reactions     Morphine Shortness Of Breath     Other reaction(s): Diaphoresis, Stomach Upset     Hydromorphone Itching         Review of Systems:    Constitutional: Negative.  Negative for fever, chills, has activity change, appetite change and fatigue.   HENT: Negative for congestion and facial swelling.    Eyes: Negative for photophobia, redness and visual disturbance.   Respiratory: Negative for cough and chest tightness.   Putting chest wall pain  Cardiovascular: Negative for chest pain, palpitations and has leg swelling.   Gastrointestinal: Negative for nausea, diarrhea, constipation, blood in stool and abdominal distention.   Genitourinary: Negative.    Musculoskeletal: Negative.  Reporting pain in her chest wall but states that is controlled with pain pills  Skin: Negative.    Neurological: Negative for dizziness, tremors, syncope, weakness, light-headedness and headaches.   Hematological: Does not bruise/bleed easily.   Psychiatric/Behavioral: Negative.  Ambulating without a walker reporting racing thoughts  Blood pressure 113/64 pulse 80 temp 98    Physical Exam:    GENERAL: no acute distress. Cooperative in conversation.  Obese  HEENT: pupils are equal, round and reactive. Oral mucosa is moist and intact.  RESP:Chest symmetric. Regular respiratory rate. No stridor.  Tender on the left side of the chest wall over the ribs  CVS: S1S2  ABD: Nondistended, soft.  EXTREMITIES: 2+ lower extremity edema.   NEURO: non focal. Alert and oriented x3.   PSYCH: within normal limits. No depression she has anxiety .  Tearful in affect and crying during exam  SKIN: warm dry intact     Labs:    Recent Labs   11/08/18  0713 11/07/18  0754 11/05/18  1123 11/04/18  0720   WBC -- 11.5 H -- 11.1 H -- 15.2 H   HGB -- -- -- 9.1 L -- 9.4 L   MCV -- -- -- 94 -- 96   PLT -- -- -- 473 H -- 528 H   INR 3.3 H 3.2 H < > -- < > 3.0 H   < > = values in this interval not displayed.     Recent Labs   11/07/18  1213 11/04/18  0720 11/03/18  1331   SODIUM 137 136 --   POTASSIUM 3.3 L 3.6 --   CHLORIDE 100 102 --   RV1SXGPE 30 27 --   BUN 11 6 L --   CREATININE 0.63 0.60 --   GLUCOSE 83 95 --   CALCIUM 9.0 8.7 --   ALBUMIN -- -- 3.6   BILITOTAL -- -- 0.8   BILIDIRECT -- -- 0.3   ALT -- -- 17   AST -- -- 27   PROTEIN -- -- 6.5     Pertinent studies:  Imaging: I reviewed pertinent imaging results.  CT Head/Brain (11/3/18):  No acute intracranial abnormality.  XR Chest  (11/3/18):  There is platelike atelectasis in the left midlung the right lung is clear. There is no pneumothorax or pleural effusion. Heart size is normal. There is a probable retrocardiac hiatal hernia. No pneumothorax or pleural effusion.  There has been no significant change from the prior study.   XR Chest (11/2/18):  Heart size and pulmonary vascularity normal. Linear atelectasis mid left lung. Lungs otherwise clear. Trachea is deviated to the right, possibly related to positioning.  CT Chest/Abd/Pelvis (11/1/18):  1.  Left eighth through 11th rib fractures.  2.  Small left pleural effusion and left basilar atelectasis. No pneumothorax.  3.  No evidence for traumatic injury within the abdomen or pelvis.  4.  Gastric bypass.     Cardiac Diagnostics: I reviewed pertinent cardiac diagnostics.  EKG (11/2/18):  Normal sinus rhythm    Assessment/Plan:    Left 8 to 11th rib fractures  Conservative treatment she did have a small left pleural effusion advised incentive spirometry also  deep breath  History of recurrent falls recently which resulted in the rib fractures felt to be most likely due to lithium toxicity  Recent admission in the hospital with sepsis and leukocytosis with infectious workup being entirely negative.  Psychiatry felt this was secondary to lithium toxicity and this was discontinued  Acute metabolic encephalopathy seems to be resolving slowly after lithium has been discontinued her Lamictal level was stable and that was continued  Serum lithium level was 1.4 which is high normal was felt that she experience some toxicity in the setting of volume depletion and acute renal insufficiency and recent NSAID use  Recheck slums is still not normal with 18/30 score  Pain management she has been discharged on topical Lidoderm patch in addition she has Percocet as needed every 6 hours and she feels that it is inadequate for pain relief and is requesting more she has underlying history of some chronic pain and  takes gabapentin but now believes this more for anxiety  Currently on a higher dose of Percocet which seems to be effective in her pain she wants to continue it for now and not discontinue it or taper down as yet as she feels she is not ready for another week  Acute on chronic lymphedema with significant 2-3+ pitting edema  Improvement noted on a higher dose of diuretic  Bipolar depression continue with her high dose of Seroquel as well as Lamictal and gabapentin along with duloxetine her lithium has been discontinued she has an appointment to see psychiatry tomorrow but wants to cancel it she was advised to keep that appointment  Vitamin D deficiency she is on high doses of supplementation at 50,000 units every week.  B12 deficiency she takes vitamin B12 injections every 4 weeks  Acute on chronic anemia discharge hemoglobin was 9.1 felt to be  related to chronic anti-Coblation and rib fractures.  Morbid obesity  Leukocytosis now believed to be noninfectious in etiology most likely a stress reaction to her fractures and pain along with lithium toxicity  History of chronic anticoagulation INR today is 1.7   Dosage was adjusted  Debilitation and falls it was felt she is not safe to go back to her home environment and her current situation and she is been discharged to the TCU for strengthening and rehab she actually appears to be quite independent with most of her ADLs  Worsening anxiety with patient requesting reinstatement of discontinued medications including clonazepam and lithium  I had a detailed discussion with this patient to monitor mood and behaviors.  Unfortunately she elected to cancel her visit with her primary psychiatrist yesterday  In the past she has not been compliant with follow-up with him either as per her own admission  She is never had a lithium level runs in the past  In light of her fall risk and cognitive impairment and recent hospitalization with toxicity concerns I told her I would neither  restart clonazepam not lithium.  She tells me that she has tried various other medications including Seroquel, she is taking this for insomnia concerns but low-dose Seroquel for anxiety does not work for her gabapentin neither, BuSpar Vistaril and multiple other medications that she is tried including psychotropic medications have been ineffective.  In light of a fall risk she cannot go back on a diazepam unless okayed by her psychiatrist.  In light of multiple side effects of lithium and the fact that she was feeling toxicity when a high normal levels puts her at risk of going back on the medication she remains adamant because she feels this is only medication which has controlled her racing thoughts.  After this detailed discussion I told her we would like her psychiatrist input if we have to restart we will do it at half the dose.  She will also contact her primary psychiatric she can see psychology to see if they can help her with her mood and behaviors.  Other than that discharge planning reviewed with her she wants to go home ASAP and is hoping to discharge home but this weekend she is ambulating without a walker.  She told me she believes that most of her symptoms were not from anything other than ibuprofen use and she feels lithium was working for her so she like to go back on it.  She also wants to take narcotics home I told her we would not give her many other than a few days supply  Total time spent was 35 minutes, more than half of it was in face-to-face counseling regarding disease state, treatment, side effects, documentation, review of clinical data and coordination of care including reviewing discharge planning reinstatement of lithium monitoring of anxiety medications and follow-up with her psychiatrist along with resumption of discontinued medications    Electronically signed by: PRO Hargrove  This progress note was completed using Dragon software and there may be grammatical errors.

## (undated) RX ORDER — KETOROLAC TROMETHAMINE 15 MG/ML
INJECTION, SOLUTION INTRAMUSCULAR; INTRAVENOUS
Status: DISPENSED
Start: 2019-08-14

## (undated) RX ORDER — KETOROLAC TROMETHAMINE 15 MG/ML
INJECTION, SOLUTION INTRAMUSCULAR; INTRAVENOUS
Status: DISPENSED
Start: 2019-07-26

## (undated) RX ORDER — KETOROLAC TROMETHAMINE 15 MG/ML
INJECTION, SOLUTION INTRAMUSCULAR; INTRAVENOUS
Status: DISPENSED
Start: 2019-08-07

## (undated) RX ORDER — KETOROLAC TROMETHAMINE 15 MG/ML
INJECTION, SOLUTION INTRAMUSCULAR; INTRAVENOUS
Status: DISPENSED
Start: 2019-08-12

## (undated) RX ORDER — KETOROLAC TROMETHAMINE 15 MG/ML
INJECTION, SOLUTION INTRAMUSCULAR; INTRAVENOUS
Status: DISPENSED
Start: 2019-08-09

## (undated) RX ORDER — KETOROLAC TROMETHAMINE 15 MG/ML
INJECTION, SOLUTION INTRAMUSCULAR; INTRAVENOUS
Status: DISPENSED
Start: 2019-07-31

## (undated) RX ORDER — KETOROLAC TROMETHAMINE 15 MG/ML
INJECTION, SOLUTION INTRAMUSCULAR; INTRAVENOUS
Status: DISPENSED
Start: 2019-08-05